# Patient Record
Sex: FEMALE | Race: BLACK OR AFRICAN AMERICAN | NOT HISPANIC OR LATINO | URBAN - METROPOLITAN AREA
[De-identification: names, ages, dates, MRNs, and addresses within clinical notes are randomized per-mention and may not be internally consistent; named-entity substitution may affect disease eponyms.]

---

## 2018-09-27 ENCOUNTER — OUTPATIENT (OUTPATIENT)
Dept: OUTPATIENT SERVICES | Facility: HOSPITAL | Age: 58
LOS: 1 days | Discharge: HOME | End: 2018-09-27

## 2018-09-27 DIAGNOSIS — M79.642 PAIN IN LEFT HAND: ICD-10-CM

## 2018-09-27 DIAGNOSIS — M79.641 PAIN IN RIGHT HAND: ICD-10-CM

## 2020-10-02 PROBLEM — Z00.00 ENCOUNTER FOR PREVENTIVE HEALTH EXAMINATION: Status: ACTIVE | Noted: 2020-10-02

## 2020-10-12 ENCOUNTER — APPOINTMENT (OUTPATIENT)
Dept: OTOLARYNGOLOGY | Facility: CLINIC | Age: 60
End: 2020-10-12

## 2021-02-10 ENCOUNTER — APPOINTMENT (OUTPATIENT)
Dept: OTOLARYNGOLOGY | Facility: CLINIC | Age: 61
End: 2021-02-10
Payer: COMMERCIAL

## 2021-02-10 DIAGNOSIS — D64.9 ANEMIA, UNSPECIFIED: ICD-10-CM

## 2021-02-10 DIAGNOSIS — F41.9 ANXIETY DISORDER, UNSPECIFIED: ICD-10-CM

## 2021-02-10 DIAGNOSIS — J31.0 CHRONIC RHINITIS: ICD-10-CM

## 2021-02-10 DIAGNOSIS — I10 ESSENTIAL (PRIMARY) HYPERTENSION: ICD-10-CM

## 2021-02-10 DIAGNOSIS — F32.9 MAJOR DEPRESSIVE DISORDER, SINGLE EPISODE, UNSPECIFIED: ICD-10-CM

## 2021-02-10 DIAGNOSIS — F19.90 OTHER PSYCHOACTIVE SUBSTANCE USE, UNSPECIFIED, UNCOMPLICATED: ICD-10-CM

## 2021-02-10 DIAGNOSIS — J45.909 UNSPECIFIED ASTHMA, UNCOMPLICATED: ICD-10-CM

## 2021-02-10 PROCEDURE — 99204 OFFICE O/P NEW MOD 45 MIN: CPT | Mod: 25

## 2021-02-10 PROCEDURE — 31231 NASAL ENDOSCOPY DX: CPT

## 2021-02-10 PROCEDURE — 99072 ADDL SUPL MATRL&STAF TM PHE: CPT

## 2021-02-10 RX ORDER — AZELASTINE HYDROCHLORIDE 205.5 UG/1
0.15 SPRAY, METERED NASAL
Qty: 2 | Refills: 6 | Status: ACTIVE | COMMUNITY
Start: 2021-02-10 | End: 1900-01-01

## 2021-02-10 RX ORDER — HYDROCHLOROTHIAZIDE 12.5 MG/1
12.5 TABLET ORAL
Refills: 0 | Status: ACTIVE | COMMUNITY

## 2021-02-10 RX ORDER — LOSARTAN POTASSIUM 50 MG/1
50 TABLET, FILM COATED ORAL
Refills: 0 | Status: ACTIVE | COMMUNITY

## 2021-02-10 RX ORDER — CHOLECALCIFEROL (VITAMIN D3)
CRYSTALS MISCELLANEOUS
Refills: 0 | Status: ACTIVE | COMMUNITY

## 2021-02-10 RX ORDER — FLUTICASONE PROPIONATE 50 UG/1
50 SPRAY, METERED NASAL DAILY
Qty: 1 | Refills: 6 | Status: ACTIVE | COMMUNITY
Start: 2021-02-10 | End: 1900-01-01

## 2021-02-10 NOTE — HISTORY OF PRESENT ILLNESS
[de-identified] : Patient presents today c/o post nasal drip. Post nasal drip causes her to cough and choke. She is constantly clearing throat.  Experiencing congestion at night , causing mouth breathing which lead her to have a sore throat. She has tired Azelastine, saline rinse and Flonase  had temporary relief. Having shooting pain in eyes, denies headaches during the pain.

## 2021-02-10 NOTE — PROCEDURE
[Congested] : congested [Allergic] : allergic signs [Pale] : pale [Normal] : the paranasal sinuses had no abnormalities

## 2021-03-22 ENCOUNTER — APPOINTMENT (OUTPATIENT)
Dept: OTOLARYNGOLOGY | Facility: CLINIC | Age: 61
End: 2021-03-22
Payer: COMMERCIAL

## 2021-03-22 DIAGNOSIS — J34.89 OTHER SPECIFIED DISORDERS OF NOSE AND NASAL SINUSES: ICD-10-CM

## 2021-03-22 DIAGNOSIS — R05 COUGH: ICD-10-CM

## 2021-03-22 DIAGNOSIS — J30.9 ALLERGIC RHINITIS, UNSPECIFIED: ICD-10-CM

## 2021-03-22 DIAGNOSIS — R79.89 OTHER SPECIFIED ABNORMAL FINDINGS OF BLOOD CHEMISTRY: ICD-10-CM

## 2021-03-22 DIAGNOSIS — K21.9 GASTRO-ESOPHAGEAL REFLUX DISEASE W/OUT ESOPHAGITIS: ICD-10-CM

## 2021-03-22 PROCEDURE — 31231 NASAL ENDOSCOPY DX: CPT

## 2021-03-22 PROCEDURE — 99214 OFFICE O/P EST MOD 30 MIN: CPT | Mod: 25

## 2021-03-22 PROCEDURE — 99072 ADDL SUPL MATRL&STAF TM PHE: CPT

## 2021-03-22 NOTE — ASSESSMENT
[FreeTextEntry1] : Risks, benefits, and alternatives of ablation of nasal swelling and  turbinate reduction were explained including but not limited to bleeding, infection, persistent symptoms, numbness, perforation, change in smell, change in taste, need for additional surgery, etc...\par \par \par LPRD-\par Gi consult\par refill meds

## 2021-03-22 NOTE — HISTORY OF PRESENT ILLNESS
[FreeTextEntry1] : Patient presents today following up on nasal obstruction, chronic rhinitis. Patient admits cough has improved. She is using medications. Not on oral meds for past few days as prescription ran out Using the nasal sprays daily. Pt has nasal obstruction at night. Pt started nasal sprays with some improvement.

## 2021-03-22 NOTE — REASON FOR VISIT
[Subsequent Evaluation] : a subsequent evaluation for [FreeTextEntry2] : nasal obstruction, chronic rhinitis

## 2021-03-22 NOTE — PHYSICAL EXAM
[Midline] : trachea located in midline position [Nasal Endoscopy Performed] : nasal endoscopy was performed, see procedure section for findings [Normal] : no abnormal secretions [de-identified] : hypertrophic

## 2021-03-29 RX ORDER — LEVOCETIRIZINE DIHYDROCHLORIDE 5 MG/1
5 TABLET ORAL
Qty: 30 | Refills: 2 | Status: ACTIVE | COMMUNITY
Start: 2021-02-10 | End: 1900-01-01

## 2021-03-29 RX ORDER — FAMOTIDINE 40 MG/1
40 TABLET, FILM COATED ORAL
Qty: 30 | Refills: 3 | Status: ACTIVE | COMMUNITY
Start: 2021-02-10 | End: 1900-01-01

## 2021-03-29 RX ORDER — PANTOPRAZOLE 40 MG/1
40 TABLET, DELAYED RELEASE ORAL
Qty: 30 | Refills: 3 | Status: ACTIVE | COMMUNITY
Start: 2021-02-10 | End: 1900-01-01

## 2021-05-10 RX ORDER — DIAZEPAM 10 MG/1
10 TABLET ORAL
Qty: 2 | Refills: 0 | Status: ACTIVE | COMMUNITY
Start: 2021-05-10 | End: 1900-01-01

## 2021-05-13 ENCOUNTER — APPOINTMENT (OUTPATIENT)
Dept: OTOLARYNGOLOGY | Facility: CLINIC | Age: 61
End: 2021-05-13
Payer: COMMERCIAL

## 2021-05-13 PROCEDURE — 30117 REMOVAL OF INTRANASAL LESION: CPT

## 2021-05-13 PROCEDURE — 30802 ABLATE INF TURBINATE SUBMUC: CPT

## 2021-05-13 PROCEDURE — 30930 THER FX NASAL INF TURBINATE: CPT | Mod: 59

## 2021-05-20 ENCOUNTER — APPOINTMENT (OUTPATIENT)
Dept: OTOLARYNGOLOGY | Facility: CLINIC | Age: 61
End: 2021-05-20
Payer: COMMERCIAL

## 2021-05-20 DIAGNOSIS — R22.0 LOCALIZED SWELLING, MASS AND LUMP, HEAD: ICD-10-CM

## 2021-05-20 DIAGNOSIS — J34.3 HYPERTROPHY OF NASAL TURBINATES: ICD-10-CM

## 2021-05-20 PROCEDURE — 99024 POSTOP FOLLOW-UP VISIT: CPT

## 2021-05-20 PROCEDURE — 31237 NSL/SINS NDSC SURG BX POLYPC: CPT | Mod: 50,58

## 2021-05-20 RX ORDER — LEVOCETIRIZINE DIHYDROCHLORIDE 5 MG/1
5 TABLET ORAL
Qty: 30 | Refills: 3 | Status: ACTIVE | COMMUNITY
Start: 2021-05-20 | End: 1900-01-01

## 2021-05-20 RX ORDER — MONTELUKAST 10 MG/1
10 TABLET, FILM COATED ORAL DAILY
Qty: 30 | Refills: 6 | Status: ACTIVE | COMMUNITY
Start: 2021-05-20 | End: 1900-01-01

## 2021-05-20 NOTE — PHYSICAL EXAM
[Nasal Endoscopy Performed] : nasal endoscopy was performed, see procedure section for findings [de-identified] : healing [Normal] : mucosa is normal [Midline] : trachea located in midline position

## 2021-05-20 NOTE — PROCEDURE
[Post-Op Patency] : post-op patency [Debridement] : debridement  [None] : none [Rigid Endoscope] : examined with a rigid endoscope [Nasal Mucosa] : bilateral purulence [Bilateral] : bilateral debridement of the nasal cavity [Normal] : the paranasal sinuses had no abnormalities [Severe] : severe [Rodo] : on both sides [Removed] : which was removed

## 2021-05-20 NOTE — HISTORY OF PRESENT ILLNESS
[FreeTextEntry1] : Patient presents today following up on turbinate reduction 5/13/21.  Has been having left ear pain, noticed it yesterday  . Has a sore throat, Feels like she has a cold denies any fever.

## 2021-05-26 ENCOUNTER — APPOINTMENT (OUTPATIENT)
Dept: OTOLARYNGOLOGY | Facility: CLINIC | Age: 61
End: 2021-05-26
Payer: COMMERCIAL

## 2021-05-26 PROCEDURE — 31237 NSL/SINS NDSC SURG BX POLYPC: CPT | Mod: 50,58

## 2021-05-26 PROCEDURE — 99024 POSTOP FOLLOW-UP VISIT: CPT

## 2021-05-26 NOTE — REASON FOR VISIT
[Subsequent Evaluation] : a subsequent evaluation for [FreeTextEntry2] : nasal turbinates hypertrophy

## 2021-05-26 NOTE — HISTORY OF PRESENT ILLNESS
[FreeTextEntry1] : Patient following up on nasal turbinates hypertrophy. Patient is s/p turb reduction 5/13/21.  Denies any more ear pain , no more headaches as of yesterday.

## 2021-05-26 NOTE — PROCEDURE
[Post-Op Patency] : post-op patency [Debridement] : debridement  [Rigid Endoscope] : examined with a rigid endoscope [Nasal Mucosa] : bilateral purulence [Bilateral] : bilateral debridement of the nasal cavity [Normal] : the paranasal sinuses had no abnormalities [Severe] : severe [Rodo] : on both sides [Removed] : which was removed

## 2021-06-10 ENCOUNTER — APPOINTMENT (OUTPATIENT)
Dept: OTOLARYNGOLOGY | Facility: CLINIC | Age: 61
End: 2021-06-10
Payer: COMMERCIAL

## 2021-06-10 DIAGNOSIS — Z98.890 OTHER SPECIFIED POSTPROCEDURAL STATES: ICD-10-CM

## 2021-06-10 PROCEDURE — 99024 POSTOP FOLLOW-UP VISIT: CPT

## 2021-06-10 PROCEDURE — 31237 NSL/SINS NDSC SURG BX POLYPC: CPT | Mod: 50,58

## 2021-06-10 NOTE — PROCEDURE
[Left] : debridement of the left nasal cavity [Normal] : the paranasal sinuses had no abnormalities [Severe] : severe [Lt] : on the left [Removed] : which was removed

## 2021-06-10 NOTE — HISTORY OF PRESENT ILLNESS
[FreeTextEntry1] : Patient presents today following up on nasal hypertrophy. Patient is s/p turb reduction. SHe has the constant need to blow her nose.

## 2021-08-05 ENCOUNTER — APPOINTMENT (OUTPATIENT)
Dept: BREAST CENTER | Facility: CLINIC | Age: 61
End: 2021-08-05
Payer: COMMERCIAL

## 2021-08-05 VITALS
SYSTOLIC BLOOD PRESSURE: 130 MMHG | TEMPERATURE: 96.2 F | HEIGHT: 64 IN | BODY MASS INDEX: 23.9 KG/M2 | DIASTOLIC BLOOD PRESSURE: 72 MMHG | WEIGHT: 140 LBS

## 2021-08-05 DIAGNOSIS — Z86.79 PERSONAL HISTORY OF OTHER DISEASES OF THE CIRCULATORY SYSTEM: ICD-10-CM

## 2021-08-05 DIAGNOSIS — Z80.41 FAMILY HISTORY OF MALIGNANT NEOPLASM OF OVARY: ICD-10-CM

## 2021-08-05 DIAGNOSIS — Z80.3 FAMILY HISTORY OF MALIGNANT NEOPLASM OF BREAST: ICD-10-CM

## 2021-08-05 DIAGNOSIS — M19.90 UNSPECIFIED OSTEOARTHRITIS, UNSPECIFIED SITE: ICD-10-CM

## 2021-08-05 DIAGNOSIS — Z78.9 OTHER SPECIFIED HEALTH STATUS: ICD-10-CM

## 2021-08-05 DIAGNOSIS — Z86.69 PERSONAL HISTORY OF OTHER DISEASES OF THE NERVOUS SYSTEM AND SENSE ORGANS: ICD-10-CM

## 2021-08-05 DIAGNOSIS — N64.4 MASTODYNIA: ICD-10-CM

## 2021-08-05 DIAGNOSIS — Z86.39 PERSONAL HISTORY OF OTHER ENDOCRINE, NUTRITIONAL AND METABOLIC DISEASE: ICD-10-CM

## 2021-08-05 DIAGNOSIS — Z80.0 FAMILY HISTORY OF MALIGNANT NEOPLASM OF DIGESTIVE ORGANS: ICD-10-CM

## 2021-08-05 PROCEDURE — 99203 OFFICE O/P NEW LOW 30 MIN: CPT

## 2021-08-06 PROBLEM — M19.90 ARTHRITIS: Status: ACTIVE | Noted: 2021-02-10

## 2021-08-06 PROBLEM — Z86.79 HISTORY OF HYPERTENSION: Status: RESOLVED | Noted: 2021-08-06 | Resolved: 2021-08-06

## 2021-08-06 PROBLEM — Z86.69 HISTORY OF MIGRAINE HEADACHES: Status: RESOLVED | Noted: 2021-08-06 | Resolved: 2021-08-06

## 2021-08-06 PROBLEM — Z86.39 HISTORY OF HYPERLIPIDEMIA: Status: RESOLVED | Noted: 2021-08-06 | Resolved: 2021-08-06

## 2021-08-06 PROBLEM — Z78.9 NON-SMOKER: Status: ACTIVE | Noted: 2021-02-10

## 2021-08-06 PROBLEM — Z80.41 FAMILY HISTORY OF OVARIAN CANCER: Status: ACTIVE | Noted: 2021-08-06

## 2021-08-06 PROBLEM — Z80.0 FAMILY HISTORY OF CHOLANGIOCARCINOMA: Status: ACTIVE | Noted: 2021-08-06

## 2021-08-06 PROBLEM — Z80.3 FAMILY HISTORY OF BREAST CANCER: Status: ACTIVE | Noted: 2021-08-06

## 2021-08-06 NOTE — ASSESSMENT
[FreeTextEntry1] : Gabrielle is 60 year old female here for evaluation of breast pain\par \par On exam, she had nondiscrete nodularities palpated throughout both breasts, but no suspicious masses were palpated; no other skin changes or nipple changes.\par \par Her most recent imaging was a b/l breast US on 2/16/2021 which revealed a stable left breast mass @12N3, measuring 5 mm, deemed BIRADS 2. \par \par She is overdue for her b/l screening mammogram.  This will be scheduled for her today.  SHe can follow up as needed.  \par \par In regards to her breast pain, it may be related to fibrocystic changes within her breast that are hormonally influenced. We spoke about possible interventions including evening primrose oil, supportive bras, and decreasing caffeine intake.  Although none of these have been consistently proven to improve breast pain, they may be tried.  If the pain becomes very severe, there have been studies of tamoxifen being effective for the treatment of breast pain, although there are risks with tamoxifen.  At this time she will try supportive measures\par \par Due to her chronic back/neck/shoulder pain as well as recurrent episodes of intertrigo, I have referred her to a plastic surgeon for evaluation of possible reduction mammaplasty.  \par \par We discussed dense breasts.  Increasing breast density has been found to increase ones risk of breast cancer, but at this time, there is no clear indication for additional imaging in this setting, as both US and MRI have not been found to improve survival.  One can consider bilateral screening US.  However, out of 1000 women screened, the use of routine US will only identify an additional 3-5 cancers.  The use of US was found to increase the likelihood of undergoing more imaging and more biopsies.  She does have dense breasts.  We have decided to proceed with screening bilateral breast US at this time.  \par \par She is otherwise at an average to intermediate risk of breast cancer and should continue with annual screening mammograms and US. \par \par All of her questions were answered.  She knows to call with any further questions or concerns. \par \par Plan:\par -b/l screening mammogram now \par -if unrevealing, she can follow up as needed \par -plastic surgery referral for possible breast reduction

## 2021-08-06 NOTE — PAST MEDICAL HISTORY
[Total Preg ___] : G[unfilled] [Live Births ___] : P[unfilled]  [Age At Live Birth ___] : Age at live birth: [unfilled] [Menarche Age ____] : age at menarche was [unfilled] [Menopause Age____] : age at menopause was [unfilled] [History of Hormone Replacement Treatment] : has no history of hormone replacement treatment [FreeTextEntry5] : hysterectomy in 2008 [FreeTextEntry6] : denies  [FreeTextEntry7] : yes for about one year, stopped in 1982 [FreeTextEntry8] : no

## 2021-08-06 NOTE — REVIEW OF SYSTEMS
Assessment/Plan      1 -viral upper respiratory tract infection -the patient should keep herself well-hydrated continue using the Flonase to reduce her postnasal drip  I will give her Robitussin with codeine to take every 4 hours as needed  2 -diabetes mellitus type 2 -will obtain blood work and reassess her after the blood work is performed  No problem-specific Assessment & Plan notes found for this encounter  Diagnoses and all orders for this visit:    Viral upper respiratory tract infection    Type 2 diabetes mellitus with microalbuminuria, without long-term current use of insulin ([facility])    Microalbuminuric diabetic nephropathy ([facility] [address] )    Other orders  -     metFORMIN (GLUCOPHAGE-XR) 500 mg 24 hr tablet; Take 2 tablets by mouth 2 (two) times a day  -     amLODIPine (NORVASC) 10 mg tablet; Take 1 tablet by mouth  -     aspirin 81 MG tablet; Take 1 tablet by mouth daily  -     atorvastatin (LIPITOR) 10 mg tablet; Take 1 tablet by mouth  -     Calcium Carbonate (CALTRATE 600) 1500 (600 Ca) MG TABS; Take by mouth  -     carvedilol (COREG) 25 mg tablet; Take 1 tablet by mouth 2 (two) times a day  -     fluticasone (FLONASE) 50 mcg/act nasal spray; 2 sprays into each nostril daily  -     glimepiride (AMARYL) 4 mg tablet; Take by mouth  -     glucosamine 500 MG CAPS capsule; Take 2 capsules by mouth daily  -     hydrochlorothiazide (HYDRODIURIL) 25 mg tablet; Take 1 tablet by mouth daily  -     potassium chloride (KLOR-CON M20) 20 mEq tablet; Take by mouth  -     lisinopril (ZESTRIL) 40 mg tablet; Take 1 tablet by mouth 2 (two) times a day  -     Omega-3 Fatty Acids (OMEGA-3 FISH OIL) 1000 MG CAPS; Take 2 capsules by mouth daily  -     spironolactone (ALDACTONE) 25 mg tablet; Take 1 tablet by mouth daily  -     Cholecalciferol (VITAMIN D3) 2000 units capsule;  Take 1 tablet by mouth daily  -     Multiple Vitamins-Minerals (WOMENS ONE DAILY) TABS; Take 1 tablet by mouth daily          Subjective:   Chief Complaint Patient presents with    New Patient     Here to establish care    Cough     c/o intermittent dry cough x 5 days and loose bowel in the AM        Patient ID: Alvaro Banegas is a 74 y o  female  Patient is a 43-year-old  female who has been living at [facility] for the past 2 and half months  The patient currently is being followed for diabetes mellitus type 2 she has evidence of microalbuminuria diabetic nephropathy controlled, hypertension, hyperlipidemia, osteopenia, history of thalassemia minor, vitamin-D deficiency  The patient's diabetes has been under excellent control with a hemoglobin A1c of 6 6  Currently the patient is taking glimepiride and metformin  The patients micro albuminuria proteinuria is being followed by a nephrologist by the name of Dr Janet Arenas she has been placed on lisinopril and amlodipine along with spironolactone a potassium-sparing diuretic  The patient's hyperlipidemia is being currently treated with atorvastatin  Patient denies any myopathies  For her vitamin-D deficiency the patient takes vitamin-D 2000 units a day  Her genetic lineage is from the [facility] from the [place] she has evidence of thalassemia minor which is manifested by anemia with very microcytic indices often confused with iron deficiency anemia  She does get a yearly eye examination through Dr Tony More office  Patient has had upper respiratory infection signs for the past 4 days  She has had a persistent nonproductive cough, some chills initially, her cough has been the most persistent problem  The cough intensifies at nighttime she appears to be nasally congested  She most likely has a postnasal drip that is causing the problem  She is using Flonase, and Robitussin DM  She is not allergic to codeine          The following portions of the patient's history were reviewed and updated as delfina  ropriate: allergies, current medications, past family history, past medical history, past social history, past surgical history and problem list     Review of Systems   Constitutional: Negative  HENT: Negative  Eyes: Negative  Respiratory: Negative  Cardiovascular: Negative  Gastrointestinal: Positive for abdominal distention  Endocrine: Negative  Genitourinary: Negative  Musculoskeletal: Positive for arthralgias  Skin: Negative  Allergic/Immunologic: Negative  Neurological: Negative  Hematological: Negative  Psychiatric/Behavioral: Negative  Objective: There were no vitals taken for this visit  Physical Exam   Constitutional: She is oriented to person, place, and time  She appears well-developed and well-nourished  HENT:   Head: Normocephalic and atraumatic  Right Ear: External ear normal    Left Ear: External ear normal    Nose: Nose normal    Mouth/Throat: Oropharynx is clear and moist    Eyes: Conjunctivae and EOM are normal  Pupils are equal, round, and reactive to light  Neck: Normal range of motion  Neck supple  Cardiovascular: Normal rate, regular rhythm, normal heart sounds and intact distal pulses  Pulmonary/Chest: Effort normal and breath sounds normal    Abdominal: Soft  Bowel sounds are normal    Musculoskeletal: Normal range of motion  Neurological: She is alert and oriented to person, place, and time  She has normal reflexes  Skin: Skin is warm and dry  Psychiatric: She has a normal mood and affect  Her behavior is normal  Judgment and thought content normal    Nursing note and vitals reviewed  [As Noted in HPI] : as noted in HPI [Skin Lesions] : no skin lesions [Skin Wound] : no skin wound [Breast Pain] : breast pain [Breast Lump] : no breast lump [Negative] : Heme/Lymph [FreeTextEntry9] : neck shoulder and back pains due to weight of breasts

## 2021-08-06 NOTE — HISTORY OF PRESENT ILLNESS
[FreeTextEntry1] : Gabrielle is 60 year old female here for evaluation of breast pain\par \par -PCP Dr Sullivan\par \par Starting about 6 months prior she has developed b/l breast pain.  \par She has not palpated any abnormal masses, denies any nipple discharge or retraction. \par She did change her diet about 8 months prior and did lose about 30 lbs.  \par \par Her work up was as follows: \par 2020: b/l dx mammo and sono: \par -breasts are extremely dense\par -no suspicious mass, microcalcifications or architectural distortion b/l \par b/l US \par -@12 N3, 0.4 cm oval hypoechoic lesion in the left breast which is stable compared with prior exams  \par -There is no suspicious mass in either breast and no axillary lymphadenopathy \par BI-RADS2\par \par 2021:  B/l US \par -@12N3, there is a stable 0.4 cm oval hypoechoic lesion in the left breast  \par BI-RADS2\par \par HISTORICAL RISK FACTORS: \par -3 prior breast biopsies, reportedly benign \par -family history of breast ca in Maternal grandmother.\par -, age at first live birth was 25 \par -prior OCP use x 1year, stopped in \par -s/p JESSE/BSO in 3/26/2008

## 2021-08-06 NOTE — PHYSICAL EXAM
[No dominant masses] : no dominant masses in right breast  [No dominant masses] : no dominant masses left breast [No Nipple Retraction] : no left nipple retraction [No Nipple Discharge] : no left nipple discharge [Normocephalic] : normocephalic [Atraumatic] : atraumatic [EOMI] : extra ocular movement intact [No Supraclavicular Adenopathy] : no supraclavicular adenopathy [No Cervical Adenopathy] : no cervical adenopathy [Examined in the supine and seated position] : examined in the supine and seated position [Symmetrical] : symmetrical [No Axillary Lymphadenopathy] : no left axillary lymphadenopathy [Soft] : abdomen soft [Not Tender] : non-tender [No Edema] : no edema [No Rashes] : no rashes [No Ulceration] : no ulceration [de-identified] : nondiscrete nodularities palpated throughout both breasts, but no suspicious masses were palpated; no other skin changes or nipple changes

## 2021-08-06 NOTE — DATA REVIEWED
[FreeTextEntry1] : 02- EXAM: ULTRASOUND BREAST BILATERAL COMPLETE\par \par HISTORY: The patient is 60 years old and is seen for evaluation of breast pain. There is no personal history of breast cancer. Family history of breast cancer: None.\par \par TECHNIQUE: A bilateral breast ultrasound was performed with complete evaluation of the four quadrants, retroareolar regions, and axillae.\par \par COMPARISON: Mammogram and breast ultrasound March 6, 2020. Breast ultrasound July 29, 2019 and October 18, 2018\par \par FINDINGS:\par BREAST ECHOTEXTURE: There is a heterogeneous background echotexture. \par \par No suspicious solid or complex cystic mass is detected in either breast. There is no lymphadenopathy in the axillae. There is a stable 0.5 cm parallel oriented hypoechoic lesion in the left breast 12:00 position 3 cm from the nipple suggestive of benign etiology. \par \par IMPRESSION: No ultrasonographic evidence of malignancy.\par \par FOLLOW-UP: Annual screening.\par \par \par ASSESSMENT: BI-RADS Category 2:  Benign.\par \par \par \par   \par \par 03- EXAM:  DIGITAL BILATERAL DIAGNOSTIC MAMMOGRAM AND TOMOSYNTHESIS AND BREAST ULTRASOUND\par \par HISTORY:  The patient is 59 years old and is seen for follow-up. There is no personal history of breast cancer. Family history of breast cancer: Maternal grandmother.\par \par CLINICAL BREAST EXAMINATION:  The patient reports that her last clinical breast exam was within the past year. \par \par COMPARISON:  The present examination has been compared to prior breast imaging studies dating back to 9/15/2017 \par \par MAMMOGRAM:\par \par TECHNIQUE:  Full-field digital mammography of bilateral breasts was obtained. Additional imaging is composed of CC and MLO views. Low-dose full-field digital breast tomosynthesis examination was performed with 3D acquisitions and C-View synthesized 2D reconstructed images. Computer-aided detection (CAD) was utilized. \par \par FINDINGS:\par BREAST COMPOSITION:  The breasts are extremely dense, which lowers the sensitivity of mammography.\par \par No suspicious masses, architectural distortion, or significant calcifications are detected.\par \par BREAST ULTRASOUND:  \par \par TECHNIQUE:  Complete bilateral breast ultrasound, with evaluation of the four quadrants, retroareolar regions and axillae, was performed. \par \par FINDINGS: \par BREAST ECHOTEXTURE:  There is a heterogeneous background echotexture. \par \par There is a 0.4 cm oval hypoechoic lesion in the left breast 12 o'clock position 3 cm from the nipple which is stable compared with prior exams suggestive of benign etiology. There is no suspicious mass in either breast and no axillary lymphadenopathy. \par \par IMPRESSION:  No mammographic or ultrasonographic evidence of malignancy. \par \par FOLLOW-UP:  Annual screening. \par \par \par ASSESSMENT:  BI-RADS Category 2:  Benign. \par  \par  \par  \par

## 2021-08-20 ENCOUNTER — OUTPATIENT (OUTPATIENT)
Dept: OUTPATIENT SERVICES | Facility: HOSPITAL | Age: 61
LOS: 1 days | Discharge: HOME | End: 2021-08-20
Payer: COMMERCIAL

## 2021-08-20 DIAGNOSIS — K21.9 GASTRO-ESOPHAGEAL REFLUX DISEASE WITHOUT ESOPHAGITIS: ICD-10-CM

## 2021-08-20 PROCEDURE — 74220 X-RAY XM ESOPHAGUS 1CNTRST: CPT | Mod: 26

## 2021-10-04 ENCOUNTER — APPOINTMENT (OUTPATIENT)
Dept: PLASTIC SURGERY | Facility: CLINIC | Age: 61
End: 2021-10-04
Payer: COMMERCIAL

## 2021-10-04 VITALS — HEIGHT: 64 IN | BODY MASS INDEX: 23.73 KG/M2 | WEIGHT: 139 LBS

## 2021-10-04 PROCEDURE — 99203 OFFICE O/P NEW LOW 30 MIN: CPT

## 2021-10-04 NOTE — PHYSICAL EXAM
[de-identified] : well-appearing, NAD [de-identified] : supple [de-identified] : \par Bilateral superior pole deflation and moderate skin tone, dense breath tissue\par Left breast: no palpable masses, nipple retraction or discharge.\par Right breast: no palpable masses, nipple retraction or discharge. transverse 4 cm lumpectomy scar  near PP\par No bilateral axillary rolls\par Chest: no trunk deformities\par Bilateral macromastia (similar volume) with Grade II ptosis with no active inframammary fold rash\par Anticipated volume of resection of EACH breast based on CLINICAL ASSESSMENT: 350-400 g\par Anticipated volume of resection of EACH breast based on BSA: 350 g\par \par Breast measurements (standing, cm):\par R SN-Nipple: 28 \par R Nipple-IMF: 13.5\par R Nipple - midsternum: 10\par R NAC diameter: 7.5\par IMF position asymmetrical, left 2 cm lower than right breast\par Bilateral NAC diminished sensation\par L SN-Nipple: 29\par L Nipple-IMF: 14.5\par L Nipple - midsternum: 11.5 \par L NAC diameter: 8\par PP: 22 [de-identified] :  bilateral shoulder grooving present\par mild scoliosis with left spinal rotation [de-identified] : soft, NT MD, mild protuberance [de-identified] : FROM

## 2021-10-04 NOTE — ASSESSMENT
[FreeTextEntry1] : 60 yo F woman with symptomatic bilateral macromastia with Grade II ptosis.\par \par -The patient is a good candidate for bilateral reduction mammoplasty with an inferior pedicle Wise pattern \par -I had a detailed discussion regarding the procedure and reviewed the benefits, risks, and outcomes. \par -The risks include but are not limited to bleeding, infection, seroma, hematoma, wound separation or poor wound healing, tissue ischemia/necrosis of skin flap or NAC, scarring in particular hypertrophic or keloid scarring, breast asymmetry, need for additional surgery, dissatisfaction with outcome, loss of NAC sensation, and inability to breastfeed postpartum in the future, and no improvement of neck pain.\par -I reviewed with the patient the location of incisional scars, possible use of surgical drain, need to wear surgical support bra post-operatively, and no post-operative heavy lifting.\par -The patient understands the procedure and the risks, and she elects to proceed with reduction mammoplasty. Informed consent was obtained.\par -Will schedule her for outpatient SHOAIB procedure.\par -Photos were taken with patient permission\par \par Due to COVID-19, pre-visit patient instructions were explained to the patient and their symptoms were checked upon arrival. Masks were used by the healthcare provider and staff and the examination room was cleaned after the patient visit concluded\par \par \par

## 2021-10-04 NOTE — HISTORY OF PRESENT ILLNESS
[FreeTextEntry1] : 62 yo  F with PMHx of OA, asthma, depression/anxiety, HTN, HLD, migraine headaches.  She reports h/o of long-standing breast pain, neck pain, shoulder pain, bra strap grooving, IMF rashes.  Here to discuss options for breast reduction.\par \par Denies breast lumps, nipple discharge or retraction.  +FamHx breast cancer\par Current 34 DD cup, feels undersized\par Desired reduction volume: ~ C cup\par \par 30 lbs weight loss over 8 months with change in diet.  Now weight stable at 139 lbs\par \par SHx: breast biopsy x 3 (benign), s/p JESSE/BSO for menorrhagia (3/2008)\par FamHx: grandmother (breast ca), sister (cholangiocarcinoma), aunt (ovarian ca)\par \par No ASA

## 2021-11-30 ENCOUNTER — OUTPATIENT (OUTPATIENT)
Dept: OUTPATIENT SERVICES | Facility: HOSPITAL | Age: 61
LOS: 1 days | Discharge: HOME | End: 2021-11-30
Payer: MEDICARE

## 2021-11-30 VITALS
OXYGEN SATURATION: 98 % | WEIGHT: 142.2 LBS | RESPIRATION RATE: 15 BRPM | HEART RATE: 69 BPM | HEIGHT: 63 IN | SYSTOLIC BLOOD PRESSURE: 118 MMHG | DIASTOLIC BLOOD PRESSURE: 75 MMHG | TEMPERATURE: 99 F

## 2021-11-30 DIAGNOSIS — Z01.818 ENCOUNTER FOR OTHER PREPROCEDURAL EXAMINATION: ICD-10-CM

## 2021-11-30 DIAGNOSIS — N62 HYPERTROPHY OF BREAST: ICD-10-CM

## 2021-11-30 DIAGNOSIS — Z90.710 ACQUIRED ABSENCE OF BOTH CERVIX AND UTERUS: Chronic | ICD-10-CM

## 2021-11-30 DIAGNOSIS — Z98.890 OTHER SPECIFIED POSTPROCEDURAL STATES: Chronic | ICD-10-CM

## 2021-11-30 DIAGNOSIS — Z87.828 PERSONAL HISTORY OF OTHER (HEALED) PHYSICAL INJURY AND TRAUMA: Chronic | ICD-10-CM

## 2021-11-30 LAB
ALBUMIN SERPL ELPH-MCNC: 4.5 G/DL — SIGNIFICANT CHANGE UP (ref 3.5–5.2)
ALP SERPL-CCNC: 74 U/L — SIGNIFICANT CHANGE UP (ref 30–115)
ALT FLD-CCNC: 26 U/L — SIGNIFICANT CHANGE UP (ref 0–41)
ANION GAP SERPL CALC-SCNC: 11 MMOL/L — SIGNIFICANT CHANGE UP (ref 7–14)
APTT BLD: 34.5 SEC — SIGNIFICANT CHANGE UP (ref 27–39.2)
AST SERPL-CCNC: 32 U/L — SIGNIFICANT CHANGE UP (ref 0–41)
BASOPHILS # BLD AUTO: 0.05 K/UL — SIGNIFICANT CHANGE UP (ref 0–0.2)
BASOPHILS NFR BLD AUTO: 1.3 % — HIGH (ref 0–1)
BILIRUB SERPL-MCNC: 0.2 MG/DL — SIGNIFICANT CHANGE UP (ref 0.2–1.2)
BUN SERPL-MCNC: 20 MG/DL — SIGNIFICANT CHANGE UP (ref 10–20)
CALCIUM SERPL-MCNC: 10 MG/DL — SIGNIFICANT CHANGE UP (ref 8.5–10.1)
CHLORIDE SERPL-SCNC: 103 MMOL/L — SIGNIFICANT CHANGE UP (ref 98–110)
CO2 SERPL-SCNC: 27 MMOL/L — SIGNIFICANT CHANGE UP (ref 17–32)
CREAT SERPL-MCNC: 0.9 MG/DL — SIGNIFICANT CHANGE UP (ref 0.7–1.5)
EOSINOPHIL # BLD AUTO: 0.04 K/UL — SIGNIFICANT CHANGE UP (ref 0–0.7)
EOSINOPHIL NFR BLD AUTO: 1 % — SIGNIFICANT CHANGE UP (ref 0–8)
GLUCOSE SERPL-MCNC: 54 MG/DL — CRITICAL LOW (ref 70–99)
HCT VFR BLD CALC: 38.5 % — SIGNIFICANT CHANGE UP (ref 37–47)
HGB BLD-MCNC: 12.1 G/DL — SIGNIFICANT CHANGE UP (ref 12–16)
IMM GRANULOCYTES NFR BLD AUTO: 0 % — LOW (ref 0.1–0.3)
INR BLD: 0.96 RATIO — SIGNIFICANT CHANGE UP (ref 0.65–1.3)
LYMPHOCYTES # BLD AUTO: 1.56 K/UL — SIGNIFICANT CHANGE UP (ref 1.2–3.4)
LYMPHOCYTES # BLD AUTO: 39 % — SIGNIFICANT CHANGE UP (ref 20.5–51.1)
MCHC RBC-ENTMCNC: 25.4 PG — LOW (ref 27–31)
MCHC RBC-ENTMCNC: 31.4 G/DL — LOW (ref 32–37)
MCV RBC AUTO: 80.9 FL — LOW (ref 81–99)
MONOCYTES # BLD AUTO: 0.4 K/UL — SIGNIFICANT CHANGE UP (ref 0.1–0.6)
MONOCYTES NFR BLD AUTO: 10 % — HIGH (ref 1.7–9.3)
NEUTROPHILS # BLD AUTO: 1.95 K/UL — SIGNIFICANT CHANGE UP (ref 1.4–6.5)
NEUTROPHILS NFR BLD AUTO: 48.7 % — SIGNIFICANT CHANGE UP (ref 42.2–75.2)
NRBC # BLD: 0 /100 WBCS — SIGNIFICANT CHANGE UP (ref 0–0)
PLATELET # BLD AUTO: 261 K/UL — SIGNIFICANT CHANGE UP (ref 130–400)
POTASSIUM SERPL-MCNC: 4.3 MMOL/L — SIGNIFICANT CHANGE UP (ref 3.5–5)
POTASSIUM SERPL-SCNC: 4.3 MMOL/L — SIGNIFICANT CHANGE UP (ref 3.5–5)
PROT SERPL-MCNC: 7.3 G/DL — SIGNIFICANT CHANGE UP (ref 6–8)
PROTHROM AB SERPL-ACNC: 11.1 SEC — SIGNIFICANT CHANGE UP (ref 9.95–12.87)
RBC # BLD: 4.76 M/UL — SIGNIFICANT CHANGE UP (ref 4.2–5.4)
RBC # FLD: 14.8 % — HIGH (ref 11.5–14.5)
SODIUM SERPL-SCNC: 141 MMOL/L — SIGNIFICANT CHANGE UP (ref 135–146)
WBC # BLD: 4 K/UL — LOW (ref 4.8–10.8)
WBC # FLD AUTO: 4 K/UL — LOW (ref 4.8–10.8)

## 2021-11-30 PROCEDURE — 93010 ELECTROCARDIOGRAM REPORT: CPT

## 2021-11-30 NOTE — H&P PST ADULT - REASON FOR ADMISSION
Patient is a    61   year old female presenting to PAST in preparation for   b/l breast reduction     on    12/14/21    under    gen    anesthesia by Dr. Torres.  Pt reports-  I have pain in my back from my breasts.  THE PAIN IS A 6/10 IN MY BACK.  I HAVE HAD THIS PAIN FOR SEVERAL YEARS.    THE PAIN RADIATES UP AND DOWN MY BACK.  ITS A RADIATING, TIGHT ACHING PAIN.  WHEN I LAY DOWN THE DECREASES. Patient is a    61   year old female presenting to PAST in preparation for   b/l breast reduction     on    12/14/21    under    gen    anesthesia by Dr. Torres.  Pt reports-  I have pain in my back from my breasts.  THE PAIN IS A 6/10 IN MY BACK.  I HAVE HAD THIS PAIN FOR SEVERAL YEARS.    THE PAIN RADIATES UP AND DOWN MY BACK.  ITS A RADIATING, TIGHT ACHING PAIN.  WHEN I LAY DOWN THE PAIN DECREASES.

## 2021-11-30 NOTE — H&P PST ADULT - HISTORY OF PRESENT ILLNESS
PT PRESENTS TO PAST WITH NO SOB, CP, PALPITATIONS, DYSURIA, UTI OR URI AT PRESENT.   PT ABLE TO WALK UP 1-2    FLIGHTS OF STEPS WITH NO SOB.  AS PER THE PT, THIS IS HIS/HER COMPLETE MEDICAL AND SURGICAL HX, INCLUDING MEDICATIONS PRESCRIBED AND OVER THE COUNTER  pt denies any covid s/s, or tested positive in the past- PT IS AWARE OF DATE AND TIME OF COVID TESTING PRIOR TO PROCEDURE.   pt advised self quarantine till day of procedure  denies travel outside the [place] in the past 30 days  Anesthesia Alert  NO--Difficult Airway  NO--History of neck surgery or radiation  NO--Limited ROM of neck  NO--History of Malignant hyperthermia  NO--Personal or family history of Pseudocholinesterase deficiency  NO--Prior Anesthesia Complication  NO--Latex Allergy  NO--Loose teeth  NO--History of Rheumatoid Arthritis  NO--TAMICA  NO BLEEDING RISK  NO--Other_____

## 2021-11-30 NOTE — H&P PST ADULT - NSICDXPASTMEDICALHX_GEN_ALL_CORE_FT
PAST MEDICAL HISTORY:  HTN (hypertension)     MVP (mitral valve prolapse)     Vertigo      PAST MEDICAL HISTORY:  Eczema     HTN (hypertension)     MVP (mitral valve prolapse)     Vertigo

## 2021-11-30 NOTE — H&P PST ADULT - NSANTHOSAYNRD_GEN_A_CORE
No. TAMICA screening performed.  STOP BANG Legend: 0-2 = LOW Risk; 3-4 = INTERMEDIATE Risk; 5-8 = HIGH Risk

## 2021-11-30 NOTE — H&P PST ADULT - NSICDXPASTSURGICALHX_GEN_ALL_CORE_FT
PAST SURGICAL HISTORY:  H/O lumpectomy     H/O lymph node biopsy BENIGN    H/O: hysterectomy     History of torn meniscus of knee

## 2021-12-06 DIAGNOSIS — G89.18 OTHER ACUTE POSTPROCEDURAL PAIN: ICD-10-CM

## 2021-12-06 RX ORDER — GABAPENTIN 300 MG/1
300 CAPSULE ORAL
Qty: 6 | Refills: 0 | Status: ACTIVE | COMMUNITY
Start: 2021-12-06 | End: 1900-01-01

## 2021-12-06 RX ORDER — CELECOXIB 200 MG/1
200 CAPSULE ORAL
Qty: 6 | Refills: 0 | Status: ACTIVE | COMMUNITY
Start: 2021-12-06 | End: 1900-01-01

## 2021-12-11 ENCOUNTER — LABORATORY RESULT (OUTPATIENT)
Age: 61
End: 2021-12-11

## 2021-12-13 NOTE — ASU PATIENT PROFILE, ADULT - NSICDXPASTMEDICALHX_GEN_ALL_CORE_FT
PAST MEDICAL HISTORY:  Eczema     HTN (hypertension)     MVP (mitral valve prolapse)     Vertigo

## 2021-12-13 NOTE — ASU PATIENT PROFILE, ADULT - FALL HARM RISK - HARM RISK INTERVENTIONS

## 2021-12-14 ENCOUNTER — OUTPATIENT (OUTPATIENT)
Dept: OUTPATIENT SERVICES | Facility: HOSPITAL | Age: 61
LOS: 1 days | Discharge: HOME | End: 2021-12-14
Payer: MEDICARE

## 2021-12-14 ENCOUNTER — APPOINTMENT (OUTPATIENT)
Dept: PLASTIC SURGERY | Facility: AMBULATORY SURGERY CENTER | Age: 61
End: 2021-12-14
Payer: COMMERCIAL

## 2021-12-14 ENCOUNTER — RESULT REVIEW (OUTPATIENT)
Age: 61
End: 2021-12-14

## 2021-12-14 VITALS
DIASTOLIC BLOOD PRESSURE: 71 MMHG | TEMPERATURE: 98 F | SYSTOLIC BLOOD PRESSURE: 108 MMHG | WEIGHT: 134.04 LBS | HEART RATE: 60 BPM | HEIGHT: 63 IN | OXYGEN SATURATION: 99 % | RESPIRATION RATE: 17 BRPM

## 2021-12-14 VITALS
OXYGEN SATURATION: 99 % | RESPIRATION RATE: 19 BRPM | HEART RATE: 65 BPM | SYSTOLIC BLOOD PRESSURE: 154 MMHG | DIASTOLIC BLOOD PRESSURE: 62 MMHG

## 2021-12-14 DIAGNOSIS — Z98.890 OTHER SPECIFIED POSTPROCEDURAL STATES: Chronic | ICD-10-CM

## 2021-12-14 DIAGNOSIS — Z90.710 ACQUIRED ABSENCE OF BOTH CERVIX AND UTERUS: Chronic | ICD-10-CM

## 2021-12-14 DIAGNOSIS — Z87.828 PERSONAL HISTORY OF OTHER (HEALED) PHYSICAL INJURY AND TRAUMA: Chronic | ICD-10-CM

## 2021-12-14 PROCEDURE — 88305 TISSUE EXAM BY PATHOLOGIST: CPT | Mod: 26

## 2021-12-14 PROCEDURE — 19318 BREAST REDUCTION: CPT | Mod: AS,50

## 2021-12-14 PROCEDURE — 19318 BREAST REDUCTION: CPT | Mod: 50

## 2021-12-14 RX ORDER — OXYCODONE HYDROCHLORIDE 5 MG/1
5 TABLET ORAL ONCE
Refills: 0 | Status: DISCONTINUED | OUTPATIENT
Start: 2021-12-14 | End: 2021-12-14

## 2021-12-14 RX ORDER — HEPARIN SODIUM 5000 [USP'U]/ML
5000 INJECTION INTRAVENOUS; SUBCUTANEOUS ONCE
Refills: 0 | Status: COMPLETED | OUTPATIENT
Start: 2021-12-14 | End: 2021-12-14

## 2021-12-14 RX ORDER — MEPERIDINE HYDROCHLORIDE 50 MG/ML
12.5 INJECTION INTRAMUSCULAR; INTRAVENOUS; SUBCUTANEOUS ONCE
Refills: 0 | Status: DISCONTINUED | OUTPATIENT
Start: 2021-12-14 | End: 2021-12-14

## 2021-12-14 RX ORDER — SODIUM CHLORIDE 9 MG/ML
1000 INJECTION, SOLUTION INTRAVENOUS
Refills: 0 | Status: DISCONTINUED | OUTPATIENT
Start: 2021-12-14 | End: 2021-12-28

## 2021-12-14 RX ORDER — TRAMADOL HYDROCHLORIDE 50 MG/1
1 TABLET ORAL
Qty: 10 | Refills: 0
Start: 2021-12-14

## 2021-12-14 RX ORDER — ONDANSETRON 8 MG/1
4 TABLET, FILM COATED ORAL ONCE
Refills: 0 | Status: DISCONTINUED | OUTPATIENT
Start: 2021-12-14 | End: 2021-12-28

## 2021-12-14 RX ORDER — ONDANSETRON 8 MG/1
1 TABLET, FILM COATED ORAL
Qty: 9 | Refills: 0
Start: 2021-12-14 | End: 2021-12-16

## 2021-12-14 RX ORDER — HYDROMORPHONE HYDROCHLORIDE 2 MG/ML
0.5 INJECTION INTRAMUSCULAR; INTRAVENOUS; SUBCUTANEOUS
Refills: 0 | Status: DISCONTINUED | OUTPATIENT
Start: 2021-12-14 | End: 2021-12-14

## 2021-12-14 RX ORDER — HYDROMORPHONE HYDROCHLORIDE 2 MG/ML
1 INJECTION INTRAMUSCULAR; INTRAVENOUS; SUBCUTANEOUS
Refills: 0 | Status: DISCONTINUED | OUTPATIENT
Start: 2021-12-14 | End: 2021-12-14

## 2021-12-14 RX ADMIN — SODIUM CHLORIDE 100 MILLILITER(S): 9 INJECTION, SOLUTION INTRAVENOUS at 14:30

## 2021-12-14 RX ADMIN — HYDROMORPHONE HYDROCHLORIDE 0.5 MILLIGRAM(S): 2 INJECTION INTRAMUSCULAR; INTRAVENOUS; SUBCUTANEOUS at 14:28

## 2021-12-14 RX ADMIN — HEPARIN SODIUM 5000 UNIT(S): 5000 INJECTION INTRAVENOUS; SUBCUTANEOUS at 10:38

## 2021-12-14 NOTE — CHART NOTE - NSCHARTNOTEFT_GEN_A_CORE
PACU ANESTHESIA ADMISSION NOTE      Procedure: Breast reduction      Post op diagnosis:  Macromastia        ____  Intubated  TV:______       Rate: ______      FiO2: ______    __x__  Patent Airway    __x__  Full return of protective reflexes    __x__  Full recovery from anesthesia / back to baseline     Vitals:   T: 97.9          R: 20                 BP: 138/80                 Sat: 100                  P: 74      Mental Status:  __x__ Awake   ___x__ Alert   _____ Drowsy   _____ Sedated    Nausea/Vomiting:  _x___ NO  ______Yes,   See Post - Op Orders          Pain Scale (0-10):  _____    Treatment: __x__ None    ____ See Post - Op/PCA Orders    Post - Operative Fluids:   ____ Oral   ___x_ See Post - Op Orders    Plan: Discharge:   __x__Home       _____Floor     _____Critical Care    _____  Other:_________________    Comments:    Uneventful anesthesia. Patient transported to  spontaneously breathing and hemodynamically stable.

## 2021-12-14 NOTE — ASU DISCHARGE PLAN (ADULT/PEDIATRIC) - ASU DC SPECIAL INSTRUCTIONSFT
Diet: You may resume your usual diet. Avoid alcohol and excessive salt intake for two weeks following surgery. This will help to minimize swelling.    Medication: Continue Gabapentin and Celebrex twice daily until complete. Take Tylenol 650mg every 6 hours. If pain is still not well controlled, take Tramadol as needed. Take all antibiotics as prescribed. Remember, no Aspirin or NSAIDS (Advil, Motrin, Aleve) as they may increase bruising. Take Zofran as needed for nausea.    Activity: Start walking as soon as possible to increase circulation and prevent blood clots. You may take care of your personal needs as desired, however, no lifting or strenuous activity is allowed for 4 weeks following surgery. Driving is not permitted for at least two weeks.    Wound care: Keep your dressing clean, dry, and intact until seen by MD/PA. Wear the surgical bra which will be provided to you at all times. Do not smoke! It delays wound healing and increases the risk of complications.    Personal Hygiene: Do not get the operative area wet. You are allowed to sponge bathe. Do not remove the surgical bra. No tub soaking.    Sun Exposure: You may be in the sun one month following surgery. Avoid sunburn. Always use a sunscreen of at least SPF30.    Things to expect: The operative area may be bruised, swollen, and painful. You may also have temporary numbness which will improve over time.    In case of emergency: call the office any time day or night. Post-operative care will be provided in the office one week following surgery. If you do not already have an appointment, please call during regular office hours to schedule: [phone].     We wish you a pleasant recovery.

## 2021-12-14 NOTE — PRE-ANESTHESIA EVALUATION ADULT - NSRADCARDRESULTSFT_GEN_ALL_CORE
EKG: Normal sinus rhythm, Left axis deviation, Minimal voltage criteria for LVH, may be normal variant

## 2021-12-14 NOTE — BRIEF OPERATIVE NOTE - OPERATION/FINDINGS
bilateral breast reduction performed, inferior wise pedicle pattern bilateral breast reduction performed, inferior pedicle Wise pattern

## 2021-12-14 NOTE — ASU DISCHARGE PLAN (ADULT/PEDIATRIC) - NS MD DC FALL RISK RISK
- - - For information on Fall & Injury Prevention, visit: [email or web address] OR  [email or web address] OR  https://www.cdc.gov/steadi/patient.html

## 2021-12-15 ENCOUNTER — NON-APPOINTMENT (OUTPATIENT)
Age: 61
End: 2021-12-15

## 2021-12-17 DIAGNOSIS — N62 HYPERTROPHY OF BREAST: ICD-10-CM

## 2021-12-17 DIAGNOSIS — Z90.710 ACQUIRED ABSENCE OF BOTH CERVIX AND UTERUS: ICD-10-CM

## 2021-12-17 DIAGNOSIS — I10 ESSENTIAL (PRIMARY) HYPERTENSION: ICD-10-CM

## 2021-12-17 DIAGNOSIS — I34.1 NONRHEUMATIC MITRAL (VALVE) PROLAPSE: ICD-10-CM

## 2021-12-17 LAB — SURGICAL PATHOLOGY STUDY: SIGNIFICANT CHANGE UP

## 2021-12-20 ENCOUNTER — APPOINTMENT (OUTPATIENT)
Dept: PLASTIC SURGERY | Facility: CLINIC | Age: 61
End: 2021-12-20
Payer: COMMERCIAL

## 2021-12-20 PROBLEM — R42 DIZZINESS AND GIDDINESS: Chronic | Status: ACTIVE | Noted: 2021-11-30

## 2021-12-20 PROBLEM — I34.1 NONRHEUMATIC MITRAL (VALVE) PROLAPSE: Chronic | Status: ACTIVE | Noted: 2021-11-30

## 2021-12-20 PROBLEM — L30.9 DERMATITIS, UNSPECIFIED: Chronic | Status: ACTIVE | Noted: 2021-11-30

## 2021-12-20 PROBLEM — I10 ESSENTIAL (PRIMARY) HYPERTENSION: Chronic | Status: ACTIVE | Noted: 2021-11-30

## 2021-12-20 PROCEDURE — 99024 POSTOP FOLLOW-UP VISIT: CPT

## 2021-12-20 NOTE — HISTORY OF PRESENT ILLNESS
[FreeTextEntry1] : 62 yo  F with PMHx of OA, asthma, depression/anxiety, HTN, HLD, migraine headaches.  She reports h/o of long-standing breast pain, neck pain, shoulder pain, bra strap grooving, IMF rashes.  Here to discuss options for breast reduction.\par \par Denies breast lumps, nipple discharge or retraction.  +FamHx breast cancer\par Current 34 DD cup, feels undersized\par Desired reduction volume: ~ C cup\par \par 30 lbs weight loss over 8 months with change in diet.  Now weight stable at 139 lbs\par \par SHx: breast biopsy x 3 (benign), s/p JESSE/BSO for menorrhagia (3/2008)\par FamHx: grandmother (breast ca), sister (cholangiocarcinoma), aunt (ovarian ca)\par \par No ASA\par \par Interval hx (21): Pt presents today POD #6 s/p BBR. Denies f/c, significant pain, nor bleeding/drainage. Taking PO abx as prescribed.

## 2021-12-20 NOTE — PHYSICAL EXAM
[de-identified] : well-appearing, NAD [de-identified] : supple [de-identified] :  bilateral shoulder grooving present\par mild scoliosis with left spinal rotation [de-identified] : BL breasts soft, symmetric, nontender, no palpabe fluid collection, incisions c/d/i with excellent cosmesis, mild nipple inversion BL [de-identified] : soft, NT MD, mild protuberance [de-identified] : FROM

## 2021-12-20 NOTE — ASSESSMENT
[FreeTextEntry1] : 60 yo F woman with symptomatic bilateral macromastia with Grade II ptosis.\par Now POD #6 s/p BBR, doing well\par \par -Pathology reviewed: benign\par -Bandages changed\par -May shower\par -Postop instructions reviewed and all questions answered\par -F/u 1 week\par \par Due to COVID-19, pre-visit patient instructions were explained to the patient and their symptoms were checked upon arrival. Masks were used by the healthcare provider and staff and the examination room was cleaned after the patient visit concluded\par \par \par

## 2021-12-28 ENCOUNTER — APPOINTMENT (OUTPATIENT)
Dept: PLASTIC SURGERY | Facility: CLINIC | Age: 61
End: 2021-12-28
Payer: COMMERCIAL

## 2021-12-28 PROCEDURE — 10160 PNXR ASPIR ABSC HMTMA BULLA: CPT | Mod: 58

## 2021-12-28 PROCEDURE — 99024 POSTOP FOLLOW-UP VISIT: CPT

## 2021-12-28 NOTE — PHYSICAL EXAM
[de-identified] : well-appearing, NAD [de-identified] : mild scoliosis with left spinal rotation [de-identified] : BL breasts soft with diffuse bruising and swelling R>L, incisions healing well, c/d/i with excellent cosmesis, nipple sensation intact, mild nipple inversion BL improving  [de-identified] : FROM

## 2021-12-28 NOTE — DATA REVIEWED
[FreeTextEntry1] :  Pathology             Final\par \par No Documents Attached\par \par \par \par   [facility] Accession Number : [id number]\par Patient:   EDY BURKS\par \par \par Accession:                             [id number]\par \par Collected Date/Time:                   12/14/2021 12:30 EST\par Received Date/Time:                    12/14/2021 14:01 EST\par \par Surgical Pathology Report - Auth (Verified)\par \par Specimen(s) Submitted\par 1  Right breast 453 gm\par 2  Left breast 482 gm\par \par Final Diagnosis\par 1. Breast, right tissue and skin, reduction:\par - Benign early atrophic fatty breast tissue and overlying skin, both\par without histopathologic abnormality; 453 grams.\par \par \par 2. Breast, left tissue and skin, reduction:\par - Benign early atrophic fatty breast tissue and overlying skin, both\par without histopathologic abnormality; 482 grams.\par Verified by: Kennedy Calvo M.D.\par (Electronic Signature)\par Reported on: 12/17/21 12:34 EST, [facility],\par [address], [place] [address]\par Phone: [phone]   Fax: [phone]\par _________________________________________________________________\par \par \par Clinical Information\par Bilateral breast reduction\par \par Perioperative Diagnosis\par Macromastia\par \par Gross Description\par 1.  The specimen is received fresh, labeled "right breast" and consists of\par multiple fragments of soft yellow fibroadipose tissue and two fragments\par of skin with attached subcutaneous tissue, weighing 453 gm and measuring\par 7 x 11 x 4 cm in aggregate. Representative sections are submitted. (6\par blocks)\par \par 2.  The specimen is received fresh, labeled "left breast" and consists of\par multiple fragments of soft yellow fibroadipose tissue and two fragments\par of skin with attached subcutaneous tissue, weighing 482 gm and measuring\par 18 x 15 x 4 cm in aggregate.  Representative sections are submitted.\par (6 blocks)\par \par Specimen was received and underwent gross examination at [facility], [address] [place] [address].\par \par 12/15/2021 06:37:33 EST nk\par \par  \par \par  Ordered by: EMILY SOSA       Collected/Examined: [id number] 12:30PM       \par Verification Required       Stage: Final       \par  Performed at: [facility]       Resulted: [id number] 12:34PM       Last Updated: [id number] 12:34PM       Accession: [id number]

## 2021-12-28 NOTE — ASSESSMENT
[FreeTextEntry1] : 60 yo F woman with symptomatic bilateral macromastia with Grade II ptosis.\par Now POD #14 s/p BBR, doing well and happy with results. Right breast seroma s/p needle aspiration performed today (30cc). \par \par -Sutures removed\par -Pathology reviewed: benign\par -May shower\par -Start Scarguard \par -Postop instructions reviewed and all questions answered\par -F/u next week for seroma check\par \par Due to COVID-19, pre-visit patient instructions were explained to the patient and their symptoms were checked upon arrival. Masks were used by the healthcare provider and staff and the examination room was cleaned after the patient visit concluded\par \par \par

## 2021-12-28 NOTE — HISTORY OF PRESENT ILLNESS
[FreeTextEntry1] : 62 yo  F with PMHx of OA, asthma, depression/anxiety, HTN, HLD, migraine headaches.  She reports h/o of long-standing breast pain, neck pain, shoulder pain, bra strap grooving, IMF rashes.  Here to discuss options for breast reduction.\par \par Denies breast lumps, nipple discharge or retraction.  +FamHx breast cancer\par Current 34 DD cup, feels undersized\par Desired reduction volume: ~ C cup\par \par 30 lbs weight loss over 8 months with change in diet.  Now weight stable at 139 lbs\par \par SHx: breast biopsy x 3 (benign), s/p JESSE/BSO for menorrhagia (3/2008)\par FamHx: grandmother (breast ca), sister (cholangiocarcinoma), aunt (ovarian ca)\par \par No ASA\par \par Interval hx (21): Pt presents today POD #6 s/p BBR. Denies f/c, significant pain, nor bleeding/drainage. Taking PO abx as prescribed.\par \par Interval hx (21): Pt presents today POD #14 s/p BBR with improving incisional discomfort. Denies any f/c or bleeding. Compliant with surgical bra.

## 2022-01-06 ENCOUNTER — APPOINTMENT (OUTPATIENT)
Dept: PLASTIC SURGERY | Facility: CLINIC | Age: 62
End: 2022-01-06
Payer: COMMERCIAL

## 2022-01-06 DIAGNOSIS — N64.89 OTHER SPECIFIED DISORDERS OF BREAST: ICD-10-CM

## 2022-01-06 DIAGNOSIS — N62 HYPERTROPHY OF BREAST: ICD-10-CM

## 2022-01-06 PROCEDURE — 10160 PNXR ASPIR ABSC HMTMA BULLA: CPT | Mod: 58

## 2022-01-06 NOTE — DATA REVIEWED
[FreeTextEntry1] :  Pathology             Final\par \par No Documents Attached\par \par \par \par   [facility] Accession Number : [id number]\par Patient:   EDY BURKS\par \par \par Accession:                             [id number]\par \par Collected Date/Time:                   12/14/2021 12:30 EST\par Received Date/Time:                    12/14/2021 14:01 EST\par \par Surgical Pathology Report - Auth (Verified)\par \par Specimen(s) Submitted\par 1  Right breast 453 gm\par 2  Left breast 482 gm\par \par Final Diagnosis\par 1. Breast, right tissue and skin, reduction:\par - Benign early atrophic fatty breast tissue and overlying skin, both\par without histopathologic abnormality; 453 grams.\par \par \par 2. Breast, left tissue and skin, reduction:\par - Benign early atrophic fatty breast tissue and overlying skin, both\par without histopathologic abnormality; 482 grams.\par Verified by: Kennedy Calvo M.D.\par (Electronic Signature)\par Reported on: 12/17/21 12:34 EST, [facility],\par [address], [place] [address]\par Phone: [phone]   Fax: [phone]\par _________________________________________________________________\par \par \par Clinical Information\par Bilateral breast reduction\par \par Perioperative Diagnosis\par Macromastia\par \par Gross Description\par 1.  The specimen is received fresh, labeled "right breast" and consists of\par multiple fragments of soft yellow fibroadipose tissue and two fragments\par of skin with attached subcutaneous tissue, weighing 453 gm and measuring\par 7 x 11 x 4 cm in aggregate. Representative sections are submitted. (6\par blocks)\par \par 2.  The specimen is received fresh, labeled "left breast" and consists of\par multiple fragments of soft yellow fibroadipose tissue and two fragments\par of skin with attached subcutaneous tissue, weighing 482 gm and measuring\par 18 x 15 x 4 cm in aggregate.  Representative sections are submitted.\par (6 blocks)\par \par Specimen was received and underwent gross examination at [facility], [address] [place] [address].\par \par 12/15/2021 06:37:33 EST nk\par \par  \par \par  Ordered by: EMILY SOSA       Collected/Examined: [id number] 12:30PM       \par Verification Required       Stage: Final       \par  Performed at: [facility]       Resulted: [id number] 12:34PM       Last Updated: [id number] 12:34PM       Accession: [id number]

## 2022-01-06 NOTE — HISTORY OF PRESENT ILLNESS
[FreeTextEntry1] : 60 yo  F with PMHx of OA, asthma, depression/anxiety, HTN, HLD, migraine headaches.  She reports h/o of long-standing breast pain, neck pain, shoulder pain, bra strap grooving, IMF rashes.  Here to discuss options for breast reduction.\par \par Denies breast lumps, nipple discharge or retraction.  +FamHx breast cancer\par Current 34 DD cup, feels undersized\par Desired reduction volume: ~ C cup\par \par 30 lbs weight loss over 8 months with change in diet.  Now weight stable at 139 lbs\par \par SHx: breast biopsy x 3 (benign), s/p JESSE/BSO for menorrhagia (3/2008)\par FamHx: grandmother (breast ca), sister (cholangiocarcinoma), aunt (ovarian ca)\par \par No ASA\par \par Interval hx (21): Pt presents today POD #6 s/p BBR. Denies f/c, significant pain, nor bleeding/drainage. Taking PO abx as prescribed.\par \par Interval hx (21): Pt presents today POD #14 s/p BBR with improving incisional discomfort. Denies any f/c or bleeding. Compliant with surgical bra. \par \par Interval hx (21): Pt presents today POD #23 s/p BBR c/o right breast discomfort and swelling. Denies any f/c or drainage.

## 2022-01-06 NOTE — ASSESSMENT
[FreeTextEntry1] : 62 yo F woman with symptomatic bilateral macromastia with Grade II ptosis.\par Now POD #23 s/p BBR, doing well and happy with results. Right breast seroma s/p needle aspiration performed 12/28 (30cc) and again today (20cc). \par \par -Continue surgical bra with compressions dressing\par -Warm compress and massage\par -Continue Scarguard \par -Postop instructions reviewed and all questions answered\par -F/u 2 weeks with Dr. Torres\par \par Due to COVID-19, pre-visit patient instructions were explained to the patient and their symptoms were checked upon arrival. Masks were used by the healthcare provider and staff and the examination room was cleaned after the patient visit concluded\par \par \par

## 2022-01-06 NOTE — PHYSICAL EXAM
[de-identified] : well-appearing, NAD [de-identified] : mild scoliosis with left spinal rotation [de-identified] : BL breasts soft with diffuse bruising and swelling R>L, resolving,  incisions healing well, c/d/i with excellent cosmesis, nipple sensation intact, mild nipple inversion BL improving

## 2022-01-28 ENCOUNTER — APPOINTMENT (OUTPATIENT)
Dept: PLASTIC SURGERY | Facility: CLINIC | Age: 62
End: 2022-01-28
Payer: COMMERCIAL

## 2022-01-28 PROCEDURE — 99024 POSTOP FOLLOW-UP VISIT: CPT

## 2022-01-28 NOTE — HISTORY OF PRESENT ILLNESS
[FreeTextEntry1] : 60 yo  F with PMHx of OA, asthma, depression/anxiety, HTN, HLD, migraine headaches.  She reports h/o of long-standing breast pain, neck pain, shoulder pain, bra strap grooving, IMF rashes.  Here to discuss options for breast reduction.\par \par Denies breast lumps, nipple discharge or retraction.  +FamHx breast cancer\par Current 34 DD cup, feels undersized\par Desired reduction volume: ~ C cup\par \par 30 lbs weight loss over 8 months with change in diet.  Now weight stable at 139 lbs\par \par SHx: breast biopsy x 3 (benign), s/p JESSE/BSO for menorrhagia (3/2008)\par FamHx: grandmother (breast ca), sister (cholangiocarcinoma), aunt (ovarian ca)\par \par No ASA\par \par Interval hx (21): Pt presents today POD #6 s/p BBR. Denies f/c, significant pain, nor bleeding/drainage. Taking PO abx as prescribed.\par \par Interval hx (21): Pt presents today POD #14 s/p BBR with improving incisional discomfort. Denies any f/c or bleeding. Compliant with surgical bra. \par \par Interval hx (21): Pt presents today POD #23 s/p BBR c/o right breast discomfort and swelling. Denies any f/c or drainage. \par \par Interval hx (22):  Here 6 weeks s/p BBR  with needle aspiration 20 ml in office at last visit.  c/o left breast lumps with occasional tenderness to touch.

## 2022-01-28 NOTE — PHYSICAL EXAM
[de-identified] : well-appearing, NAD [de-identified] : mild scoliosis with left spinal rotation [de-identified] : BL breasts soft with diffuse bruising and swelling R>L, resolving,  incisions healing well, c/d/i with excellent cosmesis, nipple sensation intact, mild nipple inversion BL improving

## 2022-01-28 NOTE — ASSESSMENT
[FreeTextEntry1] : 62 yo F woman with symptomatic bilateral macromastia with Grade II ptosis.\par Now 6 weeks s/p BBR, doing well and happy with results. Right breast seroma s/p needle aspiration performed 12/28 (30cc) and 1/5/22 (20cc). No seroma identified on needle aspiration today.\par \par Possible early fat necrosis left breast\par \par -May resume all garments and physical activity\par -Warm compress and massage to left breast\par -Continue Scarguard \par -Postop instructions reviewed and all questions answered\par -F/u 6 weeks \par \par Due to COVID-19, pre-visit patient instructions were explained to the patient and their symptoms were checked upon arrival. Masks were used by the healthcare provider and staff and the examination room was cleaned after the patient visit concluded\par \par \par

## 2022-01-28 NOTE — DATA REVIEWED
[FreeTextEntry1] :  Pathology             Final\par \par No Documents Attached\par \par \par \par   [facility] Accession Number : [id number]\par Patient:   EDY BURKS\par \par \par Accession:                             [id number]\par \par Collected Date/Time:                   12/14/2021 12:30 EST\par Received Date/Time:                    12/14/2021 14:01 EST\par \par Surgical Pathology Report - Auth (Verified)\par \par Specimen(s) Submitted\par 1  Right breast 453 gm\par 2  Left breast 482 gm\par \par Final Diagnosis\par 1. Breast, right tissue and skin, reduction:\par - Benign early atrophic fatty breast tissue and overlying skin, both\par without histopathologic abnormality; 453 grams.\par \par \par 2. Breast, left tissue and skin, reduction:\par - Benign early atrophic fatty breast tissue and overlying skin, both\par without histopathologic abnormality; 482 grams.\par Verified by: Kennedy Calvo M.D.\par (Electronic Signature)\par Reported on: 12/17/21 12:34 EST, [facility],\par [address] [facility], [place] [address]\par Phone: [phone]   Fax: [phone]\par _________________________________________________________________\par \par \par Clinical Information\par Bilateral breast reduction\par \par Perioperative Diagnosis\par Macromastia\par \par Gross Description\par 1.  The specimen is received fresh, labeled "right breast" and consists of\par multiple fragments of soft yellow fibroadipose tissue and two fragments\par of skin with attached subcutaneous tissue, weighing 453 gm and measuring\par 7 x 11 x 4 cm in aggregate. Representative sections are submitted. (6\par blocks)\par \par 2.  The specimen is received fresh, labeled "left breast" and consists of\par multiple fragments of soft yellow fibroadipose tissue and two fragments\par of skin with attached subcutaneous tissue, weighing 482 gm and measuring\par 18 x 15 x 4 cm in aggregate.  Representative sections are submitted.\par (6 blocks)\par \par Specimen was received and underwent gross examination at [facility], [address] [place] [address].\par \par 12/15/2021 06:37:33 EST nk\par \par  \par \par  Ordered by: EMILY SOSA       Collected/Examined: [id number] 12:30PM       \par Verification Required       Stage: Final       \par  Performed at: [place] [facility]       Resulted: [id number] 12:34PM       Last Updated: [id number] 12:34PM       Accession: [id number]

## 2022-03-11 ENCOUNTER — APPOINTMENT (OUTPATIENT)
Dept: PLASTIC SURGERY | Facility: CLINIC | Age: 62
End: 2022-03-11
Payer: COMMERCIAL

## 2022-03-11 PROCEDURE — 11900 INJECT SKIN LESIONS </W 7: CPT | Mod: 58

## 2022-03-11 PROCEDURE — 99024 POSTOP FOLLOW-UP VISIT: CPT

## 2022-03-11 NOTE — ASSESSMENT
[FreeTextEntry1] : 60 yo F woman with symptomatic bilateral macromastia with Grade II ptosis.\par 3 months s/p BBR, doing well and happy with results. Right breast seroma s/p needle aspiration performed 12/28 (30cc) and 1/5/22 (20cc). No seroma identified on needle aspiration today.\par \par Likely focal fat necrosis left breast\par Early IMF keloid scar s/p kenalog injection (3/11/22)\par \par -Pt tolerated the kenalog injection\par -May resume all garments and physical activity\par -Warm compress and massage to left breast\par -Continue scar mgmt - silicone sheeting\par -All questions were answered\par -F/u 3 months for left breast fat necrosis and keloid scar check, will discuss possible surgical intervention if indicated for both\par \par Due to COVID-19, pre-visit patient instructions were explained to the patient and their symptoms were checked upon arrival. Masks were used by the healthcare provider and staff and the examination room was cleaned after the patient visit concluded\par \par \par

## 2022-03-11 NOTE — DATA REVIEWED
[FreeTextEntry1] :  Pathology             Final\par \par No Documents Attached\par \par \par \par   [facility] Accession Number : [id number]\par Patient:   EDY BURKS\par \par \par Accession:                             [id number]\par \par Collected Date/Time:                   12/14/2021 12:30 EST\par Received Date/Time:                    12/14/2021 14:01 EST\par \par Surgical Pathology Report - Auth (Verified)\par \par Specimen(s) Submitted\par 1  Right breast 453 gm\par 2  Left breast 482 gm\par \par Final Diagnosis\par 1. Breast, right tissue and skin, reduction:\par - Benign early atrophic fatty breast tissue and overlying skin, both\par without histopathologic abnormality; 453 grams.\par \par \par 2. Breast, left tissue and skin, reduction:\par - Benign early atrophic fatty breast tissue and overlying skin, both\par without histopathologic abnormality; 482 grams.\par Verified by: Kennedy Calvo M.D.\par (Electronic Signature)\par Reported on: 12/17/21 12:34 EST, [facility],\par [address] [facility], [place] [address]\par Phone: [phone]   Fax: [phone]\par _________________________________________________________________\par \par \par Clinical Information\par Bilateral breast reduction\par \par Perioperative Diagnosis\par Macromastia\par \par Gross Description\par 1.  The specimen is received fresh, labeled "right breast" and consists of\par multiple fragments of soft yellow fibroadipose tissue and two fragments\par of skin with attached subcutaneous tissue, weighing 453 gm and measuring\par 7 x 11 x 4 cm in aggregate. Representative sections are submitted. (6\par blocks)\par \par 2.  The specimen is received fresh, labeled "left breast" and consists of\par multiple fragments of soft yellow fibroadipose tissue and two fragments\par of skin with attached subcutaneous tissue, weighing 482 gm and measuring\par 18 x 15 x 4 cm in aggregate.  Representative sections are submitted.\par (6 blocks)\par \par Specimen was received and underwent gross examination at [facility], [address] [place] [address].\par \par 12/15/2021 06:37:33 EST nk\par \par  \par \par  Ordered by: EMILY SOSA       Collected/Examined: [id number] 12:30PM       \par Verification Required       Stage: Final       \par  Performed at: [facility]       Resulted: [id number] 12:34PM       Last Updated: [id number] 12:34PM       Accession: [id number]

## 2022-03-11 NOTE — PHYSICAL EXAM
[de-identified] : well-appearing, NAD [de-identified] : mild scoliosis with left spinal rotation [de-identified] : BL breasts soft  incisions healed with excellent cosmesis, nipple sensation intact, left breast with 8:00 1 cm firm nodule, likely fat necrosis with mild tenderness

## 2022-03-11 NOTE — HISTORY OF PRESENT ILLNESS
[FreeTextEntry1] : 62 yo  F with PMHx of OA, asthma, depression/anxiety, HTN, HLD, migraine headaches.  She reports h/o of long-standing breast pain, neck pain, shoulder pain, bra strap grooving, IMF rashes.  Here to discuss options for breast reduction.\par \par Denies breast lumps, nipple discharge or retraction.  +FamHx breast cancer\par Current 34 DD cup, feels undersized\par Desired reduction volume: ~ C cup\par \par 30 lbs weight loss over 8 months with change in diet.  Now weight stable at 139 lbs\par \par SHx: breast biopsy x 3 (benign), s/p JESSE/BSO for menorrhagia (3/2008)\par FamHx: grandmother (breast ca), sister (cholangiocarcinoma), aunt (ovarian ca)\par \par No ASA\par \par Interval hx (21): Pt presents today POD #6 s/p BBR. Denies f/c, significant pain, nor bleeding/drainage. Taking PO abx as prescribed.\par \par Interval hx (21): Pt presents today POD #14 s/p BBR with improving incisional discomfort. Denies any f/c or bleeding. Compliant with surgical bra. \par \par Interval hx (21): Pt presents today POD #23 s/p BBR c/o right breast discomfort and swelling. Denies any f/c or drainage. \par \par Interval hx (22):  Here 6 weeks s/p BBR  with needle aspiration 20 ml in office at last visit.  c/o left breast lumps with occasional tenderness to touch.  \par \par Intreval hx (3/11/22): Here for 3 month s/p BBR, previously with needle aspiration 20 ml in office. Still complaining of left breast lumps with occasional tenderness to touch. Says she occasionally has burning pain in those areas. Of note, recently had tendon injury of R foot for which she underwent surgical repair and now walking with crutches.

## 2022-04-06 ENCOUNTER — OUTPATIENT (OUTPATIENT)
Dept: OUTPATIENT SERVICES | Facility: HOSPITAL | Age: 62
LOS: 1 days | Discharge: HOME | End: 2022-04-06

## 2022-04-06 VITALS
HEART RATE: 72 BPM | TEMPERATURE: 97 F | OXYGEN SATURATION: 99 % | DIASTOLIC BLOOD PRESSURE: 87 MMHG | RESPIRATION RATE: 16 BRPM | WEIGHT: 128.97 LBS | SYSTOLIC BLOOD PRESSURE: 160 MMHG | HEIGHT: 63 IN

## 2022-04-06 DIAGNOSIS — Z98.890 OTHER SPECIFIED POSTPROCEDURAL STATES: Chronic | ICD-10-CM

## 2022-04-06 DIAGNOSIS — Z01.818 ENCOUNTER FOR OTHER PREPROCEDURAL EXAMINATION: ICD-10-CM

## 2022-04-06 DIAGNOSIS — Z87.828 PERSONAL HISTORY OF OTHER (HEALED) PHYSICAL INJURY AND TRAUMA: Chronic | ICD-10-CM

## 2022-04-06 DIAGNOSIS — M26.609 UNSPECIFIED TEMPOROMANDIBULAR JOINT DISORDER, UNSPECIFIED SIDE: ICD-10-CM

## 2022-04-06 DIAGNOSIS — Z90.710 ACQUIRED ABSENCE OF BOTH CERVIX AND UTERUS: Chronic | ICD-10-CM

## 2022-04-06 LAB
ALBUMIN SERPL ELPH-MCNC: 4.6 G/DL — SIGNIFICANT CHANGE UP (ref 3.5–5.2)
ALP SERPL-CCNC: 67 U/L — SIGNIFICANT CHANGE UP (ref 30–115)
ALT FLD-CCNC: 27 U/L — SIGNIFICANT CHANGE UP (ref 0–41)
ANION GAP SERPL CALC-SCNC: 14 MMOL/L — SIGNIFICANT CHANGE UP (ref 7–14)
APTT BLD: 34.7 SEC — SIGNIFICANT CHANGE UP (ref 27–39.2)
AST SERPL-CCNC: 31 U/L — SIGNIFICANT CHANGE UP (ref 0–41)
BASOPHILS # BLD AUTO: 0.04 K/UL — SIGNIFICANT CHANGE UP (ref 0–0.2)
BASOPHILS NFR BLD AUTO: 1 % — SIGNIFICANT CHANGE UP (ref 0–1)
BILIRUB SERPL-MCNC: 0.3 MG/DL — SIGNIFICANT CHANGE UP (ref 0.2–1.2)
BLD GP AB SCN SERPL QL: SIGNIFICANT CHANGE UP
BUN SERPL-MCNC: 8 MG/DL — LOW (ref 10–20)
CALCIUM SERPL-MCNC: 10.1 MG/DL — SIGNIFICANT CHANGE UP (ref 8.5–10.1)
CHLORIDE SERPL-SCNC: 96 MMOL/L — LOW (ref 98–110)
CO2 SERPL-SCNC: 28 MMOL/L — SIGNIFICANT CHANGE UP (ref 17–32)
CREAT SERPL-MCNC: 0.8 MG/DL — SIGNIFICANT CHANGE UP (ref 0.7–1.5)
EGFR: 83 ML/MIN/1.73M2 — SIGNIFICANT CHANGE UP
EOSINOPHIL # BLD AUTO: 0.06 K/UL — SIGNIFICANT CHANGE UP (ref 0–0.7)
EOSINOPHIL NFR BLD AUTO: 1.5 % — SIGNIFICANT CHANGE UP (ref 0–8)
GLUCOSE SERPL-MCNC: 92 MG/DL — SIGNIFICANT CHANGE UP (ref 70–99)
HCT VFR BLD CALC: 36.6 % — LOW (ref 37–47)
HGB BLD-MCNC: 11.7 G/DL — LOW (ref 12–16)
IMM GRANULOCYTES NFR BLD AUTO: 0.2 % — SIGNIFICANT CHANGE UP (ref 0.1–0.3)
INR BLD: 0.95 RATIO — SIGNIFICANT CHANGE UP (ref 0.65–1.3)
LYMPHOCYTES # BLD AUTO: 1.99 K/UL — SIGNIFICANT CHANGE UP (ref 1.2–3.4)
LYMPHOCYTES # BLD AUTO: 48.8 % — SIGNIFICANT CHANGE UP (ref 20.5–51.1)
MCHC RBC-ENTMCNC: 25.2 PG — LOW (ref 27–31)
MCHC RBC-ENTMCNC: 32 G/DL — SIGNIFICANT CHANGE UP (ref 32–37)
MCV RBC AUTO: 78.7 FL — LOW (ref 81–99)
MONOCYTES # BLD AUTO: 0.37 K/UL — SIGNIFICANT CHANGE UP (ref 0.1–0.6)
MONOCYTES NFR BLD AUTO: 9.1 % — SIGNIFICANT CHANGE UP (ref 1.7–9.3)
NEUTROPHILS # BLD AUTO: 1.61 K/UL — SIGNIFICANT CHANGE UP (ref 1.4–6.5)
NEUTROPHILS NFR BLD AUTO: 39.4 % — LOW (ref 42.2–75.2)
NRBC # BLD: 0 /100 WBCS — SIGNIFICANT CHANGE UP (ref 0–0)
PLATELET # BLD AUTO: 259 K/UL — SIGNIFICANT CHANGE UP (ref 130–400)
POTASSIUM SERPL-MCNC: 3.5 MMOL/L — SIGNIFICANT CHANGE UP (ref 3.5–5)
POTASSIUM SERPL-SCNC: 3.5 MMOL/L — SIGNIFICANT CHANGE UP (ref 3.5–5)
PROT SERPL-MCNC: 7.2 G/DL — SIGNIFICANT CHANGE UP (ref 6–8)
PROTHROM AB SERPL-ACNC: 10.9 SEC — SIGNIFICANT CHANGE UP (ref 9.95–12.87)
RBC # BLD: 4.65 M/UL — SIGNIFICANT CHANGE UP (ref 4.2–5.4)
RBC # FLD: 15.6 % — HIGH (ref 11.5–14.5)
SODIUM SERPL-SCNC: 138 MMOL/L — SIGNIFICANT CHANGE UP (ref 135–146)
WBC # BLD: 4.08 K/UL — LOW (ref 4.8–10.8)
WBC # FLD AUTO: 4.08 K/UL — LOW (ref 4.8–10.8)

## 2022-04-06 RX ORDER — LOSARTAN POTASSIUM 100 MG/1
1 TABLET, FILM COATED ORAL
Qty: 0 | Refills: 0 | DISCHARGE

## 2022-04-06 NOTE — H&P PST ADULT - HISTORY OF PRESENT ILLNESS
Pt states for many years she has had issues with fully closing her jaw. Recently went for consult for dental work and procedure was recommended. Denies having any symptoms. Electing for listed procedure.    Denies any chest pain, difficulty breathing, SOB, palpitations, dysuria, URI, or any other infections in the last 2 weeks. Denies any recent travel, contact, or exposure to any persons with known or suspected COVID-19. Pt also denies COVID testing within the last 2 weeks. Pt denies receiving the COVID vaccine. Denies any suicidal or homicidal ideations. Pt advised to self quarantine until day of procedure. Exercise tolerance of 2 flights of stairs without dyspnea but is limited d/t foot fracture. TAMICA reviewed with patient. Pt verbalized understanding of all pre-operative instructions.    Anesthesia Alert  NO--Difficult Airway  NO--History of neck surgery or radiation  NO--Limited ROM of neck  NO--History of Malignant hyperthermia  NO--No personal or family history of Pseudocholinesterase deficiency.  NO--Prior Anesthesia Complication  NO--Latex Allergy  NO--Loose teeth  NO--History of Rheumatoid Arthritis  NO--TAMICA  NO--Bleeding Risk  NO--Other_____

## 2022-04-06 NOTE — H&P PST ADULT - HIV STATUS
[Negative/Unknown

## 2022-04-06 NOTE — H&P PST ADULT - NSICDXPASTSURGICALHX_GEN_ALL_CORE_FT
PAST SURGICAL HISTORY:  H/O bilateral breast reduction surgery     H/O foot surgery     H/O lumpectomy     H/O lymph node biopsy BENIGN    H/O: hysterectomy     History of torn meniscus of knee

## 2022-04-06 NOTE — H&P PST ADULT - TEACHING/LEARNING FACTORS INFLUENCE READINESS TO LEARN
"Patient currently is living in [place] at an assisted living facility.  Her anemia has slightly improved because the blood count is a little higher, but her iron stores are still very low.  B12 is "mediocre."Blood sugar has improved.  I recommend she drink more water to make sure her kidney stay hydrated because her kidneys looked dry on the blood test.  We will repeat the iron level and the blood count and 12 weeks, but in the meantime I want her to take [employer] blood builders Minis per the package instructions.  This is nor organic product made from beet Root spinach and strawberries and citrus and has iron and B12 and vitamin-C. This should help improve her energy as well.  She will probably have to order from [employer] or another online vitamin store etc..  New labs have been ordered" none

## 2022-04-06 NOTE — H&P PST ADULT - ATTENDING COMMENTS
I have seen and examined the patient in pre-op area. Procedure to proceed as follows. Le fort 1 osteotomy. Patient disposition will be decided at the conclusion of the surgical procedure.

## 2022-04-06 NOTE — H&P PST ADULT - NSICDXPASTMEDICALHX_GEN_ALL_CORE_FT
PAST MEDICAL HISTORY:  Childhood asthma (outgrown)    Eczema     HTN (hypertension)     MVP (mitral valve prolapse)     Vertigo

## 2022-04-06 NOTE — H&P PST ADULT - REASON FOR ADMISSION
60 yo female presents for PAST in preparation for leforte 1 maxillary osteotomy on 4/26/2022 under general anesthesia by Dr. Almanzar ([facility]).

## 2022-04-06 NOTE — H&P PST ADULT - PRO PAIN EXPRESSION
Conveyed results to patient  Verbalized understanding  He states that he feels good starting Revatio  No further questions at this time.     verbalization

## 2022-04-06 NOTE — H&P PST ADULT - NSICDXFAMILYHX_GEN_ALL_CORE_FT
FAMILY HISTORY:  FH: CHF (congestive heart failure), mother & brother  FH: HTN (hypertension), mother  FH: leukemia, father

## 2022-04-25 NOTE — ASU PATIENT PROFILE, ADULT - FALL HARM RISK - UNIVERSAL INTERVENTIONS
Bed in lowest position, wheels locked, appropriate side rails in place/Call bell, personal items and telephone in reach/Instruct patient to call for assistance before getting out of bed or chair/Non-slip footwear when patient is out of bed/[place] to call system/Physically safe environment - no spills, clutter or unnecessary equipment/Purposeful Proactive Rounding/Room/bathroom lighting operational, light cord in reach

## 2022-04-26 ENCOUNTER — TRANSCRIPTION ENCOUNTER (OUTPATIENT)
Age: 62
End: 2022-04-26

## 2022-04-26 ENCOUNTER — OUTPATIENT (OUTPATIENT)
Dept: OUTPATIENT SERVICES | Facility: HOSPITAL | Age: 62
LOS: 1 days | Discharge: HOME | End: 2022-04-26

## 2022-04-26 VITALS
RESPIRATION RATE: 20 BRPM | SYSTOLIC BLOOD PRESSURE: 168 MMHG | OXYGEN SATURATION: 99 % | DIASTOLIC BLOOD PRESSURE: 95 MMHG | HEART RATE: 63 BPM

## 2022-04-26 VITALS
RESPIRATION RATE: 18 BRPM | HEIGHT: 64 IN | TEMPERATURE: 97 F | HEART RATE: 68 BPM | SYSTOLIC BLOOD PRESSURE: 127 MMHG | WEIGHT: 136.91 LBS | DIASTOLIC BLOOD PRESSURE: 75 MMHG | OXYGEN SATURATION: 100 %

## 2022-04-26 DIAGNOSIS — Z98.890 OTHER SPECIFIED POSTPROCEDURAL STATES: Chronic | ICD-10-CM

## 2022-04-26 DIAGNOSIS — Z87.828 PERSONAL HISTORY OF OTHER (HEALED) PHYSICAL INJURY AND TRAUMA: Chronic | ICD-10-CM

## 2022-04-26 DIAGNOSIS — Z90.710 ACQUIRED ABSENCE OF BOTH CERVIX AND UTERUS: Chronic | ICD-10-CM

## 2022-04-26 LAB — ABO RH CONFIRMATION: SIGNIFICANT CHANGE UP

## 2022-04-26 RX ORDER — CYCLOSPORINE 0.5 MG/ML
1 EMULSION OPHTHALMIC
Qty: 0 | Refills: 0 | DISCHARGE

## 2022-04-26 RX ORDER — LOSARTAN POTASSIUM 100 MG/1
1 TABLET, FILM COATED ORAL
Qty: 0 | Refills: 0 | DISCHARGE

## 2022-04-26 RX ORDER — DICLOFENAC SODIUM 75 MG/1
1 TABLET, DELAYED RELEASE ORAL
Qty: 0 | Refills: 0 | DISCHARGE

## 2022-04-26 RX ORDER — LABETALOL HCL 100 MG
10 TABLET ORAL ONCE
Refills: 0 | Status: COMPLETED | OUTPATIENT
Start: 2022-04-26 | End: 2022-04-26

## 2022-04-26 RX ORDER — SODIUM CHLORIDE 9 MG/ML
1000 INJECTION, SOLUTION INTRAVENOUS
Refills: 0 | Status: DISCONTINUED | OUTPATIENT
Start: 2022-04-26 | End: 2022-04-26

## 2022-04-26 RX ORDER — CHOLECALCIFEROL (VITAMIN D3) 125 MCG
25 CAPSULE ORAL
Qty: 0 | Refills: 0 | DISCHARGE

## 2022-04-26 RX ORDER — HYDROMORPHONE HYDROCHLORIDE 2 MG/ML
0.5 INJECTION INTRAMUSCULAR; INTRAVENOUS; SUBCUTANEOUS
Refills: 0 | Status: DISCONTINUED | OUTPATIENT
Start: 2022-04-26 | End: 2022-04-26

## 2022-04-26 RX ADMIN — Medication 10 MILLIGRAM(S): at 15:40

## 2022-04-26 RX ADMIN — HYDROMORPHONE HYDROCHLORIDE 0.5 MILLIGRAM(S): 2 INJECTION INTRAMUSCULAR; INTRAVENOUS; SUBCUTANEOUS at 15:55

## 2022-04-26 NOTE — ASU PREOP CHECKLIST - 1.
ABX taken as prescribed by primary before sx for prophylaxis, h/o MD MELY Anesthesia Sirisha made aware and states ok to take with sip of eater [id number]

## 2022-04-26 NOTE — ASU DISCHARGE PLAN (ADULT/PEDIATRIC) - NS MD DC FALL RISK RISK
Thank you, I did reach out to patient to inform her and also provided Testing site hours and locations For information on Fall & Injury Prevention, visit: [email or web address] OR  [email or web address] OR  https://www.cdc.gov/steadi/patient.html

## 2022-04-26 NOTE — ASU DISCHARGE PLAN (ADULT/PEDIATRIC) - ASU DC SPECIAL INSTRUCTIONSFT
Please call [phone] to make your follow up appointment and for any additional questions or concerns that you may have.

## 2022-04-26 NOTE — BRIEF OPERATIVE NOTE - NSICDXBRIEFPROCEDURE_GEN_ALL_CORE_FT
PROCEDURES:  Osteotomy, LeFort I, involving movement of 1 segment, without using bone graft, for reconstruction of midface 26-Apr-2022 14:39:48  Shaun Sierra

## 2022-04-26 NOTE — CHART NOTE - NSCHARTNOTEFT_GEN_A_CORE
PACU ANESTHESIA ADMISSION NOTE      Procedure: Osteotomy, LeFort I, involving movement of 1 segment, without using bone graft, for reconstruction of midface      Post op diagnosis:  Malocclusion        ____  Intubated  TV:______       Rate: ______      FiO2: ______    _x___  Patent Airway    ___x_  Full return of protective reflexes    _x___  Full recovery from anesthesia / back to baseline     Vitals:   T:36.2           R:  11                BP:  154/79                Sat:   99                P: 74      Mental Status:  _x___ Awake   __x___ Alert   _____ Drowsy   _____ Sedated    Nausea/Vomiting:  ___x_ NO  ______Yes,   See Post - Op Orders          Pain Scale (0-10):  _x____    Treatment: ____ None    ____ See Post - Op/PCA Orders    Post - Operative Fluids:   ____ Oral   __x__ See Post - Op Orders    Plan: Discharge:   x____Home       _____Floor     _____Critical Care    _____  Other:_________________    Comments: tolerated procedure well

## 2022-04-26 NOTE — ASU DISCHARGE PLAN (ADULT/PEDIATRIC) - CARE PROVIDER_API CALL
Hermes Almanzar; DDS)  Dentistry  [address] [place] [address]  Phone: [phone]  Fax: [phone]  Established Patient  Scheduled Appointment: 04/27/2022

## 2022-04-28 DIAGNOSIS — M26.29 OTHER ANOMALIES OF DENTAL ARCH RELATIONSHIP: ICD-10-CM

## 2022-04-28 DIAGNOSIS — M26.609 UNSPECIFIED TEMPOROMANDIBULAR JOINT DISORDER, UNSPECIFIED SIDE: ICD-10-CM

## 2022-06-13 ENCOUNTER — APPOINTMENT (OUTPATIENT)
Dept: PLASTIC SURGERY | Facility: CLINIC | Age: 62
End: 2022-06-13
Payer: COMMERCIAL

## 2022-06-13 PROBLEM — J45.909 UNSPECIFIED ASTHMA, UNCOMPLICATED: Chronic | Status: ACTIVE | Noted: 2022-04-06

## 2022-06-13 PROCEDURE — 11900 INJECT SKIN LESIONS </W 7: CPT

## 2022-06-13 PROCEDURE — 99212 OFFICE O/P EST SF 10 MIN: CPT | Mod: 25

## 2022-06-13 NOTE — ASSESSMENT
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 Right breast seroma s/p needle aspiration performed 12/28 (30cc) and 1/5/22 (20cc). No seroma identified on needle aspiration today.\par \par Focal fat necrosis left breast, tender on palpation\par Early IMF keloid scar s/p kenalog injection (3/11/22, 6/13/22)\par \par -Pt tolerated the kenalog injection\par -May resume all garments and physical activity\par -Warm compress and massage to left breast\par -Continue scar mgmt - silicone sheeting to BL IMF \par -recommend silicone sheeting right anterior ankle HTS\par -Cleared to resumer mammograms\par -All questions were answered\par -F/u 3 months for left breast fat necrosis and keloid scar check, will discuss possible surgical intervention if indicated for both\par \par Due to COVID-19, pre-visit patient instructions were explained to the patient and their symptoms were checked upon arrival. Masks were used by the healthcare provider and staff and the examination room was cleaned after the patient visit concluded\par \par \par

## 2022-06-13 NOTE — DATA REVIEWED
[FreeTextEntry1] :  Pathology             Final\par \par No Documents Attached\par \par \par \par   [facility] Accession Number : [id number]\par Patient:   EDY BURKS\par \par \par Accession:                             [id number]\par \par Collected Date/Time:                   12/14/2021 12:30 EST\par Received Date/Time:                    12/14/2021 14:01 EST\par \par Surgical Pathology Report - Auth (Verified)\par \par Specimen(s) Submitted\par 1  Right breast 453 gm\par 2  Left breast 482 gm\par \par Final Diagnosis\par 1. Breast, right tissue and skin, reduction:\par - Benign early atrophic fatty breast tissue and overlying skin, both\par without histopathologic abnormality; 453 grams.\par \par \par 2. Breast, left tissue and skin, reduction:\par - Benign early atrophic fatty breast tissue and overlying skin, both\par without histopathologic abnormality; 482 grams.\par Verified by: Kennedy Calvo M.D.\par (Electronic Signature)\par Reported on: 12/17/21 12:34 EST, [facility],\par [address] [facility], [place] [address]\par Phone: [phone]   Fax: [phone]\par _________________________________________________________________\par \par \par Clinical Information\par Bilateral breast reduction\par \par Perioperative Diagnosis\par Macromastia\par \par Gross Description\par 1.  The specimen is received fresh, labeled "right breast" and consists of\par multiple fragments of soft yellow fibroadipose tissue and two fragments\par of skin with attached subcutaneous tissue, weighing 453 gm and measuring\par 7 x 11 x 4 cm in aggregate. Representative sections are submitted. (6\par blocks)\par \par 2.  The specimen is received fresh, labeled "left breast" and consists of\par multiple fragments of soft yellow fibroadipose tissue and two fragments\par of skin with attached subcutaneous tissue, weighing 482 gm and measuring\par 18 x 15 x 4 cm in aggregate.  Representative sections are submitted.\par (6 blocks)\par \par Specimen was received and underwent gross examination at [facility], [address] [place] [address].\par \par 12/15/2021 06:37:33 EST nk\par \par  \par \par  Ordered by: EMILY SOSA       Collected/Examined: [id number] 12:30PM       \par Verification Required       Stage: Final       \par  Performed at: [facility]       Resulted: [id number] 12:34PM       Last Updated: [id number] 12:34PM       Accession: [id number]

## 2022-06-13 NOTE — PROCEDURE
[Nl] : None [FreeTextEntry1] : early keloid scar BL breast IMF [FreeTextEntry2] : early keloid scar BL breast IMF [FreeTextEntry3] : cold refrigerant [FreeTextEntry6] : The benefits, risks, and outcomes of kenalog injection were discussed. The risks include infection, hypopigmentation, poor wound healing, fat atrophy, and dissatisfaction with outcome. The patient understood the risks and elected to proceed with steroid injection.\par \par Kenalog:\par Right breast 2\par Left breast 3\par \par Total kenalog 5 mg

## 2022-06-13 NOTE — PHYSICAL EXAM
[de-identified] : mild scoliosis with left spinal rotation [de-identified] : well-appearing, NAD [de-identified] : BL breasts soft  incisions healed with excellent cosmesis, nipple sensation intact, left breast palpable nodule, likely 2 cm x 1 cm fat necrosis with mild tenderness to palpation [de-identified] : right dorsal proximal foot with flattening HTS firm scar

## 2022-06-13 NOTE — HISTORY OF PRESENT ILLNESS
[FreeTextEntry1] : 62 yo  F with PMHx of OA, asthma, depression/anxiety, HTN, HLD, migraine headaches.  She reports h/o of long-standing breast pain, neck pain, shoulder pain, bra strap grooving, IMF rashes.  Here to discuss options for breast reduction.\par \par Denies breast lumps, nipple discharge or retraction.  +FamHx breast cancer\par Current 34 DD cup, feels undersized\par Desired reduction volume: ~ C cup\par \par 30 lbs weight loss over 8 months with change in diet.  Now weight stable at 139 lbs\par \par SHx: breast biopsy x 3 (benign), s/p JESSE/BSO for menorrhagia (3/2008)\par FamHx: grandmother (breast ca), sister (cholangiocarcinoma), aunt (ovarian ca)\par \par No ASA\par \par Interval hx (21): Pt presents today POD #6 s/p BBR. Denies f/c, significant pain, nor bleeding/drainage. Taking PO abx as prescribed.\par \par Interval hx (21): Pt presents today POD #14 s/p BBR with improving incisional discomfort. Denies any f/c or bleeding. Compliant with surgical bra. \par \par Interval hx (21): Pt presents today POD #23 s/p BBR c/o right breast discomfort and swelling. Denies any f/c or drainage. \par \par Interval hx (22):  Here 6 weeks s/p BBR  with needle aspiration 20 ml in office at last visit.  c/o left breast lumps with occasional tenderness to touch.  \par \par Intreval hx (3/11/22): Here for 3 month s/p BBR, previously with needle aspiration 20 ml in office. Still complaining of left breast lumps with occasional tenderness to touch. Says she occasionally has burning pain in those areas. Of note, recently had tendon injury of R foot for which she underwent surgical repair and now walking with crutches.  \par \par Interval hx (22):  6 months s/p BBR.  Here for keloid scar check, last kenalog injection 3/11/22.  Using silicone sheeting for most of the time.  Occasional burning and pruritis compared to last visit.  Also c/o right foot scar raised; occasional burning pain.

## 2022-09-12 ENCOUNTER — APPOINTMENT (OUTPATIENT)
Dept: PLASTIC SURGERY | Facility: CLINIC | Age: 62
End: 2022-09-12

## 2022-09-12 PROCEDURE — 99212 OFFICE O/P EST SF 10 MIN: CPT

## 2022-09-12 NOTE — ASSESSMENT
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 Right breast seroma s/p needle aspiration performed 12/28 (30cc) and 1/5/22 (20cc). No seroma identified on needle aspiration today.\par \par left breast dense breast tissue unlikely fat necrosis - also normal breast US and mammogram\par Early IMF keloid scar s/p kenalog injection (3/11/22, 6/13/22), resolved keloids\par \par -No restrictions\par -reviewed US and mammogram - BIRADS2 and normal, no evidence of fat necrosis\par -no indication for left breast surgery - no evidence of fat necrosis\par -All questions were answered\par -F/u 1 year for left breast\par \par Due to COVID-19, pre-visit patient instructions were explained to the patient and their symptoms were checked upon arrival. Masks were used by the healthcare provider and staff and the examination room was cleaned after the patient visit concluded\par \par \par

## 2022-09-12 NOTE — PHYSICAL EXAM
[de-identified] : well-appearing, NAD [de-identified] : mild scoliosis with left spinal rotation [de-identified] : BL breasts soft  incisions healed with excellent cosmesis, nipple sensation intact, left breast palpable non-discrete dense breast tissue that is NT, scars flat and NT. [de-identified] : right dorsal proximal foot with flattening HTS firm scar

## 2022-09-12 NOTE — HISTORY OF PRESENT ILLNESS
[FreeTextEntry1] : 62 yo  F with PMHx of OA, asthma, depression/anxiety, HTN, HLD, migraine headaches.  She reports h/o of long-standing breast pain, neck pain, shoulder pain, bra strap grooving, IMF rashes.  Here to discuss options for breast reduction.\par \par Denies breast lumps, nipple discharge or retraction.  +FamHx breast cancer\par Current 34 DD cup, feels undersized\par Desired reduction volume: ~ C cup\par \par 30 lbs weight loss over 8 months with change in diet.  Now weight stable at 139 lbs\par \par SHx: breast biopsy x 3 (benign), s/p JESSE/BSO for menorrhagia (3/2008)\par FamHx: grandmother (breast ca), sister (cholangiocarcinoma), aunt (ovarian ca)\par \par No ASA\par \par Interval hx (21): Pt presents today POD #6 s/p BBR. Denies f/c, significant pain, nor bleeding/drainage. Taking PO abx as prescribed.\par \par Interval hx (21): Pt presents today POD #14 s/p BBR with improving incisional discomfort. Denies any f/c or bleeding. Compliant with surgical bra. \par \par Interval hx (21): Pt presents today POD #23 s/p BBR c/o right breast discomfort and swelling. Denies any f/c or drainage. \par \par Interval hx (22):  Here 6 weeks s/p BBR  with needle aspiration 20 ml in office at last visit.  c/o left breast lumps with occasional tenderness to touch.  \par \par Intreval hx (3/11/22): Here for 3 month s/p BBR, previously with needle aspiration 20 ml in office. Still complaining of left breast lumps with occasional tenderness to touch. Says she occasionally has burning pain in those areas. Of note, recently had tendon injury of R foot for which she underwent surgical repair and now walking with crutches.  \par \par Interval hx (22):  6 months s/p BBR.  Here for keloid scar check, last kenalog injection 3/11/22.  Using silicone sheeting for most of the time.  Occasional burning and pruritis compared to last visit.  Also c/o right foot scar raised; occasional burning pain.\par \par Interval hx (22): 9 months s/p BBR.  Here for left breast possible fat necrosis check and keloid scar follow up. No complaints regarding left breast; just notes area of firmness left medial breast, otherwise asymptomatic.  Had mammogram and US 22

## 2022-09-12 NOTE — DATA REVIEWED
[FreeTextEntry1] :  Pathology             Final\par \par No Documents Attached\par \par \par \par   [facility] Accession Number : [id number]\par Patient:   EDY BURKS\par \par \par Accession:                             [id number]\par \par Collected Date/Time:                   12/14/2021 12:30 EST\par Received Date/Time:                    12/14/2021 14:01 EST\par \par Surgical Pathology Report - Auth (Verified)\par \par Specimen(s) Submitted\par 1  Right breast 453 gm\par 2  Left breast 482 gm\par \par Final Diagnosis\par 1. Breast, right tissue and skin, reduction:\par - Benign early atrophic fatty breast tissue and overlying skin, both\par without histopathologic abnormality; 453 grams.\par \par \par 2. Breast, left tissue and skin, reduction:\par - Benign early atrophic fatty breast tissue and overlying skin, both\par without histopathologic abnormality; 482 grams.\par Verified by: Kennedy Calvo M.D.\par (Electronic Signature)\par Reported on: 12/17/21 12:34 EST, [facility],\par [address] [facility], [place] [address]\par Phone: [phone]   Fax: [phone]\par _________________________________________________________________\par \par \par Clinical Information\par Bilateral breast reduction\par \par Perioperative Diagnosis\par Macromastia\par \par Gross Description\par 1.  The specimen is received fresh, labeled "right breast" and consists of\par multiple fragments of soft yellow fibroadipose tissue and two fragments\par of skin with attached subcutaneous tissue, weighing 453 gm and measuring\par 7 x 11 x 4 cm in aggregate. Representative sections are submitted. (6\par blocks)\par \par 2.  The specimen is received fresh, labeled "left breast" and consists of\par multiple fragments of soft yellow fibroadipose tissue and two fragments\par of skin with attached subcutaneous tissue, weighing 482 gm and measuring\par 18 x 15 x 4 cm in aggregate.  Representative sections are submitted.\par (6 blocks)\par \par Specimen was received and underwent gross examination at [facility], [address] [place] [address].\par \par 12/15/2021 06:37:33 EST nk\par \par  \par \par  Ordered by: EMILY SOSA       Collected/Examined: [id number] 12:30PM       \par Verification Required       Stage: Final       \par  Performed at: [facility]       Resulted: [id number] 12:34PM       Last Updated: [id number] 12:34PM       Accession: [id number]

## 2023-09-13 ENCOUNTER — APPOINTMENT (OUTPATIENT)
Dept: PLASTIC SURGERY | Facility: CLINIC | Age: 63
End: 2023-09-13
Payer: COMMERCIAL

## 2023-09-13 PROCEDURE — 99212 OFFICE O/P EST SF 10 MIN: CPT

## 2024-02-02 NOTE — PROCEDURE
Provider is on call this weekend.     Will follow up early next week to determine if virtual phone call ok for pt    Being followed for SAH/seizure/migraine   [Nl] : None [FreeTextEntry1] : early keloid scar BL breast IMF [FreeTextEntry2] : early keloid scar BL breast IMF [FreeTextEntry3] : cold refrigerant [FreeTextEntry6] : The benefits, risks, and outcomes of kenalog injection were discussed. The risks include infection, hypopigmentation, poor wound healing, fat atrophy, and dissatisfaction with outcome. The patient understood the risks and elected to proceed with steroid injection.\par \par Kenalog:\par Right breast 2\par Left breast 3\par \par Total kenalog 5 mg

## 2024-08-13 ENCOUNTER — APPOINTMENT (OUTPATIENT)
Dept: ORTHOPEDIC SURGERY | Facility: CLINIC | Age: 64
End: 2024-08-13
Payer: MEDICARE

## 2024-08-13 DIAGNOSIS — M77.11 LATERAL EPICONDYLITIS, RIGHT ELBOW: ICD-10-CM

## 2024-08-13 DIAGNOSIS — M18.11 UNILATERAL PRIMARY OSTEOARTHRITIS OF FIRST CARPOMETACARPAL JOINT, RIGHT HAND: ICD-10-CM

## 2024-08-13 PROCEDURE — 73110 X-RAY EXAM OF WRIST: CPT | Mod: RT

## 2024-08-13 PROCEDURE — 99204 OFFICE O/P NEW MOD 45 MIN: CPT

## 2024-08-14 PROBLEM — M18.11 LOCALIZED PRIMARY OSTEOARTHRITIS OF FIRST CARPOMETACARPAL JOINT OF RIGHT WRIST: Status: ACTIVE | Noted: 2024-08-14

## 2024-08-14 PROBLEM — M77.11 LATERAL EPICONDYLITIS OF RIGHT ELBOW: Status: ACTIVE | Noted: 2024-08-14

## 2024-08-14 NOTE — ASSESSMENT
[FreeTextEntry1] : Patient comes in with complaints of right upper extremity discomfort.  She has pain in the right hand and points to the area the basal joint.  She is some pain in the elbow as well as some pain in the shoulder.  This has been going on for some time.  She has not had any treatment for this.  She says it is very painful.  She says not using the hand helps her.  full active range of motion of the right shoulder, wrist and fingers full active range of motion of the right elbow Tenderness to palpation of the lateral epicondyle and proximal extensor supinator mass pain with resisted wrist extension and forearm supination 4/5 strength in supination and wrist extension, otherwise 5/5 strength median/ulnar/radial sensation intact to light touch ain/pin/ulnar motor intact palpable pulses CR<2s  R hand:  +thumb CMC swelling  +thumb CMC tenderness  Decreased thumb ROM  +Basal grind test  X-rays of the right wrist 3 views show basal joint arthritis  The patient was advised of the diagnosis. The natural history of the pathology was explained in full to the patient in layman's terms. All questions were answered. The risks and benefits of surgical and non-surgical treatment alternatives were explained in full to the patient. We discussed treatment options including nsaids, topical gels, tennis elbow strap, PT, activity modification, cortisone inj, sx .pt is aware that symptoms usually resolve on their own in 95-99% of people, but the timeframe is unknown. Home exercises/stretches were demonstrated and the patient practiced them as well- They are to do the exercises hourly and hold the stretch for 30 seconds.  Avoid repetitive wrist flexion time was spent instructing the patient on appropriate placement of the elbow strap as well.  The patient is going to do stretching.  She is going to work on that for now.  As for the shoulder she will see Dr. Jordan.  The patient was advised of the diagnosis. The natural history of the pathology was explained in full to the patient in layman's terms. We reviewed that the forces applied to the thumb tip are magnified 12-14 times at the thumb cmc joint.  We also reviewed the progression of arthritis with early arthritis showing minimal to no xray changes.  We discussed treatment options, including activity modification(demonstrated), medicine(topical and oral), bracing, therapy, injection and eventually surgery.  We reviewed the r/b of each.  All questions were answered and the patient verbalized understanding The patient was given handouts on what braces to get.  She will try this.  If it does not help we will consider injections.  She will take anti-inflammatories as needed.

## 2024-09-10 ENCOUNTER — APPOINTMENT (OUTPATIENT)
Dept: ORTHOPEDIC SURGERY | Facility: CLINIC | Age: 64
End: 2024-09-10
Payer: MEDICARE

## 2024-09-10 DIAGNOSIS — S46.219A STRAIN OF MUSCLE, FASCIA AND TENDON OF OTHER PARTS OF BICEPS, UNSPECIFIED ARM, INITIAL ENCOUNTER: ICD-10-CM

## 2024-09-10 PROCEDURE — 99213 OFFICE O/P EST LOW 20 MIN: CPT

## 2024-09-10 PROCEDURE — 99203 OFFICE O/P NEW LOW 30 MIN: CPT

## 2024-09-11 NOTE — HISTORY OF PRESENT ILLNESS
[de-identified] : This is a 63-year-old patient here for evaluation of right biceps muscle strain.  Denies acute trauma but is having pain to the right biceps.  Right upper extremity: Tender palpation over the mid biceps muscle belly, full range of motion the shoulder and the elbow, normal hook, weakness with biceps flexion due to pain, no palpable gap, neurovascular intact  Plan I went over fines with the patient.  Will start physical therapy, will take anti-inflammatory medications, we will get an MRI of the right humerus to assess the biceps to assess any potential injury.  Will follow-up and see me after the MRI

## 2024-10-31 ENCOUNTER — APPOINTMENT (OUTPATIENT)
Dept: ORTHOPEDIC SURGERY | Facility: CLINIC | Age: 64
End: 2024-10-31

## 2024-11-06 ENCOUNTER — APPOINTMENT (OUTPATIENT)
Dept: UROGYNECOLOGY | Facility: CLINIC | Age: 64
End: 2024-11-06
Payer: MEDICARE

## 2024-11-06 VITALS
HEIGHT: 64 IN | BODY MASS INDEX: 24.41 KG/M2 | WEIGHT: 143 LBS | HEART RATE: 62 BPM | DIASTOLIC BLOOD PRESSURE: 86 MMHG | SYSTOLIC BLOOD PRESSURE: 143 MMHG

## 2024-11-06 DIAGNOSIS — N39.41 URGE INCONTINENCE: ICD-10-CM

## 2024-11-06 DIAGNOSIS — N62 HYPERTROPHY OF BREAST: ICD-10-CM

## 2024-11-06 DIAGNOSIS — G89.18 OTHER ACUTE POSTPROCEDURAL PAIN: ICD-10-CM

## 2024-11-06 DIAGNOSIS — J34.3 HYPERTROPHY OF NASAL TURBINATES: ICD-10-CM

## 2024-11-06 DIAGNOSIS — K59.00 CONSTIPATION, UNSPECIFIED: ICD-10-CM

## 2024-11-06 DIAGNOSIS — Z87.09 PERSONAL HISTORY OF OTHER DISEASES OF THE RESPIRATORY SYSTEM: ICD-10-CM

## 2024-11-06 DIAGNOSIS — Z87.898 PERSONAL HISTORY OF OTHER SPECIFIED CONDITIONS: ICD-10-CM

## 2024-11-06 DIAGNOSIS — F19.90 OTHER PSYCHOACTIVE SUBSTANCE USE, UNSPECIFIED, UNCOMPLICATED: ICD-10-CM

## 2024-11-06 DIAGNOSIS — R22.0 LOCALIZED SWELLING, MASS AND LUMP, HEAD: ICD-10-CM

## 2024-11-06 DIAGNOSIS — N64.89 OTHER SPECIFIED DISORDERS OF BREAST: ICD-10-CM

## 2024-11-06 DIAGNOSIS — N64.4 MASTODYNIA: ICD-10-CM

## 2024-11-06 DIAGNOSIS — Z98.890 OTHER SPECIFIED POSTPROCEDURAL STATES: ICD-10-CM

## 2024-11-06 PROCEDURE — 51701 INSERT BLADDER CATHETER: CPT

## 2024-11-06 PROCEDURE — 99459 PELVIC EXAMINATION: CPT

## 2024-11-06 PROCEDURE — 99205 OFFICE O/P NEW HI 60 MIN: CPT | Mod: 25

## 2024-11-06 RX ORDER — CYCLOSPORINE 0.5 MG/ML
0.05 EMULSION OPHTHALMIC
Refills: 0 | Status: ACTIVE | COMMUNITY

## 2024-11-08 LAB — URINE CULTURE <10: NORMAL

## 2025-03-25 ENCOUNTER — APPOINTMENT (OUTPATIENT)
Dept: RHEUMATOLOGY | Facility: CLINIC | Age: 65
End: 2025-03-25